# Patient Record
Sex: MALE | Race: WHITE | NOT HISPANIC OR LATINO
[De-identification: names, ages, dates, MRNs, and addresses within clinical notes are randomized per-mention and may not be internally consistent; named-entity substitution may affect disease eponyms.]

---

## 2017-05-18 ENCOUNTER — APPOINTMENT (OUTPATIENT)
Dept: CARDIOLOGY | Facility: CLINIC | Age: 72
End: 2017-05-18

## 2017-08-31 ENCOUNTER — APPOINTMENT (OUTPATIENT)
Dept: CARDIOLOGY | Facility: CLINIC | Age: 72
End: 2017-08-31
Payer: MEDICARE

## 2017-08-31 PROCEDURE — 93000 ELECTROCARDIOGRAM COMPLETE: CPT

## 2017-08-31 PROCEDURE — 99214 OFFICE O/P EST MOD 30 MIN: CPT

## 2018-01-22 ENCOUNTER — RECORD ABSTRACTING (OUTPATIENT)
Age: 73
End: 2018-01-22

## 2018-01-24 ENCOUNTER — RX RENEWAL (OUTPATIENT)
Age: 73
End: 2018-01-24

## 2018-01-25 ENCOUNTER — APPOINTMENT (OUTPATIENT)
Dept: CARDIOLOGY | Facility: CLINIC | Age: 73
End: 2018-01-25

## 2018-02-01 ENCOUNTER — APPOINTMENT (OUTPATIENT)
Dept: CARDIOLOGY | Facility: CLINIC | Age: 73
End: 2018-02-01
Payer: MEDICARE

## 2018-02-01 VITALS
DIASTOLIC BLOOD PRESSURE: 64 MMHG | BODY MASS INDEX: 29.8 KG/M2 | HEIGHT: 72 IN | HEART RATE: 61 BPM | SYSTOLIC BLOOD PRESSURE: 120 MMHG | WEIGHT: 220 LBS

## 2018-02-01 DIAGNOSIS — Z86.19 PERSONAL HISTORY OF OTHER INFECTIOUS AND PARASITIC DISEASES: ICD-10-CM

## 2018-02-01 DIAGNOSIS — Z87.898 PERSONAL HISTORY OF OTHER SPECIFIED CONDITIONS: ICD-10-CM

## 2018-02-01 DIAGNOSIS — Z86.010 PERSONAL HISTORY OF COLONIC POLYPS: ICD-10-CM

## 2018-02-01 PROCEDURE — 99214 OFFICE O/P EST MOD 30 MIN: CPT

## 2018-02-01 RX ORDER — MULTIVITAMIN
TABLET ORAL
Refills: 0 | Status: ACTIVE | COMMUNITY

## 2018-05-29 ENCOUNTER — RX RENEWAL (OUTPATIENT)
Age: 73
End: 2018-05-29

## 2018-06-12 ENCOUNTER — MEDICATION RENEWAL (OUTPATIENT)
Age: 73
End: 2018-06-12

## 2018-08-23 ENCOUNTER — APPOINTMENT (OUTPATIENT)
Dept: CARDIOLOGY | Facility: CLINIC | Age: 73
End: 2018-08-23
Payer: MEDICARE

## 2018-08-23 ENCOUNTER — NON-APPOINTMENT (OUTPATIENT)
Age: 73
End: 2018-08-23

## 2018-08-23 VITALS
SYSTOLIC BLOOD PRESSURE: 110 MMHG | DIASTOLIC BLOOD PRESSURE: 62 MMHG | WEIGHT: 220 LBS | HEIGHT: 72 IN | BODY MASS INDEX: 29.8 KG/M2 | OXYGEN SATURATION: 94 % | HEART RATE: 58 BPM

## 2018-08-23 PROCEDURE — 99214 OFFICE O/P EST MOD 30 MIN: CPT

## 2018-08-23 PROCEDURE — 93000 ELECTROCARDIOGRAM COMPLETE: CPT

## 2018-09-07 ENCOUNTER — CLINICAL ADVICE (OUTPATIENT)
Age: 73
End: 2018-09-07

## 2018-10-22 ENCOUNTER — OUTPATIENT (OUTPATIENT)
Dept: OUTPATIENT SERVICES | Facility: HOSPITAL | Age: 73
LOS: 1 days | End: 2018-10-22

## 2019-01-24 ENCOUNTER — APPOINTMENT (OUTPATIENT)
Dept: CARDIOLOGY | Facility: CLINIC | Age: 74
End: 2019-01-24
Payer: MEDICARE

## 2019-01-24 VITALS
SYSTOLIC BLOOD PRESSURE: 124 MMHG | WEIGHT: 230 LBS | DIASTOLIC BLOOD PRESSURE: 64 MMHG | OXYGEN SATURATION: 97 % | HEART RATE: 61 BPM | BODY MASS INDEX: 31.15 KG/M2 | HEIGHT: 72 IN

## 2019-01-24 PROCEDURE — 99214 OFFICE O/P EST MOD 30 MIN: CPT

## 2019-01-24 NOTE — HISTORY OF PRESENT ILLNESS
[FreeTextEntry1] : Dedrick is a pleasant 73-year-old male With history of overwt., hypertension, dyslipidemia, mild mitral valve prolapse, Prediabetes, EDS \par \par Complaint of pain in the right forearm and elbow. He went to the ER. Ultrasound was negative for DVT. Tendinitis was diagnosed. Motrin was recommended. He is not started this yet. There is no swelling or redness. Pain and tenderness is much improved.\par \par ASx for high T. Sujit on labs, chronic. He has mild Polycythemia with mildly low plt  & T. Sujit is borderline high. He is ASx. He follows Dr. Solorio  \par \par HTN is chronic, controlled with meds and ASx. \par \par He as Mild pre-diabetes. \par \par No history of MI, CHF, CVA, cardiac syncope, angina, chronic renal disease. No shortness of breath\par \par He has high PSA - chronic, seeing Dr. Batista. No Urinary Sx.  \par \par History of cough and sputum production. Seeing pulmonary next week.

## 2019-01-24 NOTE — REVIEW OF SYSTEMS
[Joint Pain] : joint pain [Negative] : Heme/Lymph [Feeling Fatigued] : not feeling fatigued [see HPI] : see HPI [FreeTextEntry1] : Right forearm pain

## 2019-01-24 NOTE — DISCUSSION/SUMMARY
[FreeTextEntry1] : \par - Occasional vertigo secondary to sinus allergies: P.r.n. Antivert and Claritin\par - Solitary varicose vein right forearm; if it increases in size, refer to vascular\par - Hx of Right wrist pain: No swelling or lack of strength. Slight tender on deep palpation. Warm compresses and NSAIDs. Orthopedic consultation. Also suspected right forearm tendinitis. Physical therapy with ultrasound and pain relief. Start Motrin.\par - Overweight: Need Weight loss .. Also reduce high glycemic foods and saturated fats. He has been successful in reducing his weightin the past \par - CT FU with Dr. Navarro and Dr. Batista. \par - Quit Cigar smoking.\par - The patient does have family history of prostate cancer.- His son had it \par \par - Primary care: Reminded regarding eye examination Reminded him regarding routine Prostate examination and follow PSA as well as UA with . \par \par - Had  Follow-up colonoscopy 2018\par - repeat  Routine labs in mid year.\par \par \par Sincerely,\par Frederick Casanova MD, FACC, TIMA\par \par

## 2019-01-24 NOTE — PHYSICAL EXAM
[General Appearance - Well Developed] : well developed [Normal Appearance] : normal appearance [Well Groomed] : well groomed [General Appearance - Well Nourished] : well nourished [No Deformities] : no deformities [General Appearance - In No Acute Distress] : no acute distress [Normal Conjunctiva] : the conjunctiva exhibited no abnormalities [Eyelids - No Xanthelasma] : the eyelids demonstrated no xanthelasmas [Normal Oral Mucosa] : normal oral mucosa [No Oral Pallor] : no oral pallor [No Oral Cyanosis] : no oral cyanosis [Respiration, Rhythm And Depth] : normal respiratory rhythm and effort [Exaggerated Use Of Accessory Muscles For Inspiration] : no accessory muscle use [Auscultation Breath Sounds / Voice Sounds] : lungs were clear to auscultation bilaterally [Heart Rate And Rhythm] : heart rate and rhythm were normal [Heart Sounds] : normal S1 and S2 [Murmurs] : no murmurs present [Abdomen Soft] : soft [Abdomen Tenderness] : non-tender [Abnormal Walk] : normal gait [Gait - Sufficient For Exercise Testing] : the gait was sufficient for exercise testing [Nail Clubbing] : no clubbing of the fingernails [Skin Color & Pigmentation] : normal skin color and pigmentation [Skin Turgor] : normal skin turgor [] : no rash [Oriented To Time, Place, And Person] : oriented to person, place, and time [Affect] : the affect was normal [Mood] : the mood was normal [No Anxiety] : not feeling anxious [FreeTextEntry1] : Solitary dilation of a right forearm vein; nontender; most likely varicose; compressible; nonpulsatile

## 2019-01-24 NOTE — ASSESSMENT
[FreeTextEntry1] : - Ultrasound of the abdomen Aug 2015 showed renal cyst in the lower pole on the right side colon, 7 x 8.4 cm.\par - ETT May 2016: Exercise 7 minutes Augusto protocol.  No ischemia or arrhythmia.\par - EKG 08/31/17: Sinus bradycardia. Right bundle-branch block. No ST change.\par - Labs May 2017\par - Labs January 2018: A1c 5.7, . PSA 4.11.TSH 2.24. Vitamin D pending\par \par

## 2019-02-28 ENCOUNTER — APPOINTMENT (OUTPATIENT)
Dept: CARDIOLOGY | Facility: CLINIC | Age: 74
End: 2019-02-28
Payer: MEDICARE

## 2019-02-28 VITALS
BODY MASS INDEX: 30.88 KG/M2 | SYSTOLIC BLOOD PRESSURE: 110 MMHG | DIASTOLIC BLOOD PRESSURE: 66 MMHG | HEART RATE: 55 BPM | WEIGHT: 228 LBS | OXYGEN SATURATION: 96 % | HEIGHT: 72 IN

## 2019-02-28 PROCEDURE — 99214 OFFICE O/P EST MOD 30 MIN: CPT

## 2019-02-28 NOTE — REVIEW OF SYSTEMS
[Feeling Fatigued] : not feeling fatigued [see HPI] : see HPI [Joint Pain] : joint pain [Negative] : Heme/Lymph [FreeTextEntry1] : Right forearm pain

## 2019-02-28 NOTE — ASSESSMENT
[FreeTextEntry1] : Reviewed previously\par - Ultrasound of the abdomen Aug 2015 showed renal cyst in the lower pole on the right side colon, 7 x 8.4 cm.\par - ETT May 2016: Exercise 7 minutes Augusto protocol.  No ischemia or arrhythmia.\par - EKG 08/31/17: Sinus bradycardia. Right bundle-branch block. No ST change.\par - Labs May 2017\par \par \par Review today:\par - Labs January 2019: Hemoglobin 16.8. Normal white count and platelets 141. . Otherwise normal CMP. Normal CK\par - Labs January 2018: A1c 5.7, . PSA 4.11.TSH 2.24. Vitamin D pending

## 2019-02-28 NOTE — PHYSICAL EXAM
[General Appearance - Well Developed] : well developed [Normal Appearance] : normal appearance [Well Groomed] : well groomed [General Appearance - Well Nourished] : well nourished [No Deformities] : no deformities [General Appearance - In No Acute Distress] : no acute distress [Normal Conjunctiva] : the conjunctiva exhibited no abnormalities [Eyelids - No Xanthelasma] : the eyelids demonstrated no xanthelasmas [Normal Oral Mucosa] : normal oral mucosa [No Oral Pallor] : no oral pallor [No Oral Cyanosis] : no oral cyanosis [Respiration, Rhythm And Depth] : normal respiratory rhythm and effort [Exaggerated Use Of Accessory Muscles For Inspiration] : no accessory muscle use [Auscultation Breath Sounds / Voice Sounds] : lungs were clear to auscultation bilaterally [Heart Rate And Rhythm] : heart rate and rhythm were normal [Heart Sounds] : normal S1 and S2 [Murmurs] : no murmurs present [FreeTextEntry1] : Solitary dilation of a right forearm vein; nontender; most likely varicose; compressible; nonpulsatile [Abdomen Soft] : soft [Abdomen Tenderness] : non-tender [Abnormal Walk] : normal gait [Gait - Sufficient For Exercise Testing] : the gait was sufficient for exercise testing [Nail Clubbing] : no clubbing of the fingernails [Skin Color & Pigmentation] : normal skin color and pigmentation [Skin Turgor] : normal skin turgor [] : no rash [Oriented To Time, Place, And Person] : oriented to person, place, and time [Affect] : the affect was normal [Mood] : the mood was normal [No Anxiety] : not feeling anxious

## 2019-02-28 NOTE — HISTORY OF PRESENT ILLNESS
[FreeTextEntry1] : Dedrick is a pleasant 73-year-old male With history of overwt., hypertension, dyslipidemia, mild mitral valve prolapse, Prediabetes, EDS \par \par Complaint of pain in the right forearm and elbow. He went to the ER. Ultrasound was negative for DVT. Tendinitis was diagnosed. He takes Motrin which helps. He has not gone to physical therapy yet. I have reminded him that it is important.\par \par ASx for high T. Sujit on labs, chronic. He has mild Polycythemia with mildly low plt  & T. Sujit is borderline high. He is ASx. He follows Dr. Solorio  \par \par HTN is chronic, controlled with meds and ASx. \par \par He as Mild pre-diabetes. \par \par No history of MI, CHF, CVA, cardiac syncope, angina, chronic renal disease. No shortness of breath\par \par He has high PSA - chronic, seeing Dr. Batista. No Urinary Sx.  \par \par History of cough and sputum production Which has Improved. He saw pulmonary. CT of the chest was done. Followup CT chest has been scheduled.

## 2019-02-28 NOTE — DISCUSSION/SUMMARY
[FreeTextEntry1] : \par - Occasional vertigo secondary to sinus allergies: P.r.n. Antivert and Claritin\par - Solitary varicose vein right forearm; if it increases in size, refer to vascular\par - Hx of  right forearm tendinitis. I have again reminded Physical therapy with ultrasound and pain relief. on Motrin.\par - Overweight: Need Weight loss .. Also reduce high glycemic foods and saturated fats. He has been successful in reducing his weight in the past \par - CT FU with Dr. Navarro and Dr. Batista. \par - Quit Cigar smoking. He is following pulmonary with CT scans.\par - The patient does have family history of prostate cancer.- His son had it \par \par - Primary care: Reminded regarding eye examination Reminded him regarding routine Prostate examination and follow PSA as well as UA with . \par \par - Had  Follow-up colonoscopy 2018\par - repeat  Routine labs in mid year.\par \par \par Sincerely,\par Frederick Casanova MD, FACC, TIM[facility]\par \par

## 2019-05-28 ENCOUNTER — MEDICATION RENEWAL (OUTPATIENT)
Age: 74
End: 2019-05-28

## 2019-10-31 ENCOUNTER — NON-APPOINTMENT (OUTPATIENT)
Age: 74
End: 2019-10-31

## 2019-10-31 ENCOUNTER — APPOINTMENT (OUTPATIENT)
Dept: CARDIOLOGY | Facility: CLINIC | Age: 74
End: 2019-10-31
Payer: MEDICARE

## 2019-10-31 ENCOUNTER — APPOINTMENT (OUTPATIENT)
Dept: CARDIOLOGY | Facility: CLINIC | Age: 74
End: 2019-10-31

## 2019-10-31 VITALS
OXYGEN SATURATION: 97 % | HEIGHT: 72 IN | WEIGHT: 219 LBS | HEART RATE: 57 BPM | SYSTOLIC BLOOD PRESSURE: 108 MMHG | DIASTOLIC BLOOD PRESSURE: 56 MMHG | BODY MASS INDEX: 29.66 KG/M2
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
## 2019-10-31 PROCEDURE — 90688 IIV4 VACCINE SPLT 0.5 ML IM: CPT

## 2019-10-31 PROCEDURE — 99214 OFFICE O/P EST MOD 30 MIN: CPT

## 2019-10-31 PROCEDURE — 93000 ELECTROCARDIOGRAM COMPLETE: CPT

## 2019-10-31 PROCEDURE — G0008: CPT

## 2019-10-31 RX ORDER — NEBIVOLOL HYDROCHLORIDE 10 MG/1
10 TABLET ORAL
Qty: 90 | Refills: 3 | Status: DISCONTINUED | COMMUNITY
Start: 2018-05-29 | End: 2019-10-31

## 2019-10-31 RX ORDER — DOXYCYCLINE HYCLATE 200 MG/1
200 TABLET, DELAYED RELEASE ORAL DAILY
Qty: 1 | Refills: 0 | Status: DISCONTINUED | COMMUNITY
End: 2019-10-31

## 2019-10-31 NOTE — DISCUSSION/SUMMARY
[FreeTextEntry1] : - Check carotid ultrasound. He will call for the results.\par - Occasional vertigo secondary to sinus allergies: P.r.n. Antivert and Claritin\par - Solitary varicose vein right forearm; if it increases in size, refer to vascular\par - Overweight: Need Weight loss .. Also reduce high glycemic foods and saturated fats. He has been successful in reducing his weight in the past \par - CT FU with Dr. Navarro and Dr. Batista. \par - Quit Cigar smoking. He is following pulmonary.\par - The patient does have family history of prostate cancer.- His son had it . \par \par - Primary care: Reminded regarding eye examination Reminded him regarding routine Prostate examination and follow PSA as well as UA with . \par \par - Had  Follow-up colonoscopy 2018\par - Full shot today\par \par \par Sincerely,\par Frederick Casanova MD, FACC, TIMA\par \par

## 2019-10-31 NOTE — ASSESSMENT
[FreeTextEntry1] : Reviewed previously\par - Ultrasound of the abdomen Aug 2015 showed renal cyst in the lower pole on the right side colon, 7 x 8.4 cm.\par - ETT May 2016: Exercise 7 minutes Augusto protocol.  No ischemia or arrhythmia.\par - EKG 08/31/17: Sinus bradycardia. Right bundle-branch block. No ST change.\par - Labs May 2017\par \par \par Review today:\par - Labs January 2019: Hemoglobin 16.8. Normal white count and platelets 141. . Otherwise normal CMP. Normal CK\par - Labs January 2018: A1c 5.7, . PSA 4.11.TSH 2.24. Vitamin D pending\par - Labs September 2019: LDL 66. Normal A1c, TSH

## 2019-10-31 NOTE — REVIEW OF SYSTEMS
[see HPI] : see HPI [Joint Pain] : joint pain [Negative] : Heme/Lymph [Feeling Fatigued] : not feeling fatigued [FreeTextEntry1] : Right forearm pain

## 2019-10-31 NOTE — HISTORY OF PRESENT ILLNESS
[FreeTextEntry1] : Dedrick is a pleasant 74-year-old male With history of overwt., hypertension, dyslipidemia, mild mitral valve prolapse, Prediabetes, EDS \par \par His elbow tendinitis has resolved. He now has more knee pains\par \par ASx for high T. Sujit on labs In the past. He has mild Polycythemia with mildly low plt  & T. Sujit is borderline high. He is ASx. He follows Dr. Solorio  \par \par HTN is chronic, controlled with meds and ASx. \par \par He as Mild pre-diabetes. \par \par No history of MI, CHF, CVA, cardiac syncope, angina, chronic renal disease. No shortness of breath\par \par He has high PSA - chronic, seeing Dr. Batista. No Urinary Sx.  \par \par No further cough. no chest pain or shortness of breath

## 2019-11-13 ENCOUNTER — APPOINTMENT (OUTPATIENT)
Dept: CARDIOLOGY | Facility: CLINIC | Age: 74
End: 2019-11-13
Payer: MEDICARE

## 2019-11-13 PROCEDURE — 93880 EXTRACRANIAL BILAT STUDY: CPT

## 2019-11-13 PROCEDURE — 93306 TTE W/DOPPLER COMPLETE: CPT

## 2019-12-31 ENCOUNTER — APPOINTMENT (OUTPATIENT)
Dept: CARDIOLOGY | Facility: CLINIC | Age: 74
End: 2019-12-31
Payer: MEDICARE

## 2019-12-31 VITALS
WEIGHT: 218 LBS | HEART RATE: 65 BPM | HEIGHT: 72 IN | DIASTOLIC BLOOD PRESSURE: 70 MMHG | OXYGEN SATURATION: 97 % | SYSTOLIC BLOOD PRESSURE: 140 MMHG | BODY MASS INDEX: 29.53 KG/M2

## 2019-12-31 PROCEDURE — 99214 OFFICE O/P EST MOD 30 MIN: CPT

## 2019-12-31 PROCEDURE — 93000 ELECTROCARDIOGRAM COMPLETE: CPT

## 2020-01-18 NOTE — DISCUSSION/SUMMARY
[FreeTextEntry1] : \par - Occasional vertigo secondary to sinus allergies: P.r.n. Antivert and Claritin\par - Solitary varicose vein right forearm; if it increases in size, refer to vascular\par - Overweight: Need Weight loss .. Also reduce high glycemic foods and saturated fats. \par -Several coronary risk factors; epigastric discomfort -evaluate for CAD noninvasively with nuclear stress testing without beta-blockers.\par - CT FU with Dr. Navarro and Dr. Batista. \par - Quit Cigar smoking. He is following pulmonary.\par - The patient does have family history of prostate cancer.- His son had it . \par \par - Primary care: Reminded regarding outine Prostate examination and follow PSA as well as UA with . \par - Had  Follow-up colonoscopy 2018\par - He had flu shot and pneumonia shot this year.  He had shingles vaccination 4 to 5 years ago.\par \par \par Sincerely,\par Frederick Casanova MD, FACC, TIMA\par \par

## 2020-01-18 NOTE — ASSESSMENT
[FreeTextEntry1] : Reviewed previously\par - Ultrasound of the abdomen Aug 2015 showed renal cyst in the lower pole on the right side colon, 7 x 8.4 cm.\par - ETT May 2016: Exercise 7 minutes Augusto protocol.  No ischemia or arrhythmia.\par - EKG 08/31/17: Sinus bradycardia. Right bundle-branch block. No ST change.\par - Labs May 2017\par \par \par Review today:\par - Labs January 2019: Hemoglobin 16.8. Normal white count and platelets 141. . Otherwise normal CMP. Normal CK\par - Labs January 2018: A1c 5.7, . PSA 4.11.TSH 2.24. Vitamin D pending\par - Labs September 2019: LDL 66. Normal A1c, TSH\par -Carotid ultrasound November 2019: Atherosclerosis without stenosis\par -Echocardiogram November 2019: Normal LV function and wall motion. Normal PASP.  Mild diastolic dysfunction.

## 2020-01-18 NOTE — HISTORY OF PRESENT ILLNESS
[FreeTextEntry1] : Dedrick is a pleasant 74-year-old male With history of overwt., hypertension, dyslipidemia, mild mitral valve prolapse, Prediabetes, EDS \par \par His elbow tendinitis has resolved. He now has more knee pains\par \par ASx for high T. Sujit on labs In the past. He has mild Polycythemia with mildly low plt  & T. Sujit is borderline high. He is ASx. He follows Dr. Solorio  \par \par HTN is chronic, controlled with meds and ASx. \par \par He as Mild pre-diabetes. \par \par No history of MI, CHF, CVA, cardiac syncope, angina, chronic renal disease. No shortness of breath\par \par He has high PSA - chronic, seeing Dr. Batista. No Urinary Sx.  \par \par Denies any palpitations, shortness of breath, PND, orthopnea.  Epigastric chest discomfort is difficult to differentiate from GERD symptoms.

## 2020-01-18 NOTE — ADDENDUM
[FreeTextEntry1] : Pt is scheduled for cataract surgery.\par At present, there are no active cardiac conditions. \par No recent unstable coronary syndromes, decompensated heart failure, severe valvular heart disease or significant dysrhythmias. \par Baseline functional status is acceptable. \par The clinical benefit of the proposed procedure outweighs the associated cardiovascular risk. \par Risk not attenuated with further CV testing. \par Prior testing as outlined above.\par Optimized from a cardiovascular perspective.\par

## 2020-01-18 NOTE — PHYSICAL EXAM
[General Appearance - Well Developed] : well developed [Normal Appearance] : normal appearance [No Deformities] : no deformities [General Appearance - Well Nourished] : well nourished [Well Groomed] : well groomed [Eyelids - No Xanthelasma] : the eyelids demonstrated no xanthelasmas [Normal Conjunctiva] : the conjunctiva exhibited no abnormalities [General Appearance - In No Acute Distress] : no acute distress [Normal Oral Mucosa] : normal oral mucosa [No Oral Pallor] : no oral pallor [No Oral Cyanosis] : no oral cyanosis [Respiration, Rhythm And Depth] : normal respiratory rhythm and effort [Auscultation Breath Sounds / Voice Sounds] : lungs were clear to auscultation bilaterally [Exaggerated Use Of Accessory Muscles For Inspiration] : no accessory muscle use [Heart Sounds] : normal S1 and S2 [Murmurs] : no murmurs present [Heart Rate And Rhythm] : heart rate and rhythm were normal [Abnormal Walk] : normal gait [Abdomen Soft] : soft [Abdomen Tenderness] : non-tender [Gait - Sufficient For Exercise Testing] : the gait was sufficient for exercise testing [Nail Clubbing] : no clubbing of the fingernails [Skin Color & Pigmentation] : normal skin color and pigmentation [] : no rash [Skin Turgor] : normal skin turgor [Oriented To Time, Place, And Person] : oriented to person, place, and time [Affect] : the affect was normal [No Anxiety] : not feeling anxious [Mood] : the mood was normal [FreeTextEntry1] : Solitary dilation of a right forearm vein; nontender; most likely varicose; compressible; nonpulsatile

## 2020-06-08 ENCOUNTER — APPOINTMENT (OUTPATIENT)
Dept: CARDIOLOGY | Facility: CLINIC | Age: 75
End: 2020-06-08
Payer: MEDICARE

## 2020-06-08 ENCOUNTER — APPOINTMENT (OUTPATIENT)
Dept: CARDIOLOGY | Facility: CLINIC | Age: 75
End: 2020-06-08

## 2020-06-08 PROCEDURE — 93015 CV STRESS TEST SUPVJ I&R: CPT

## 2020-06-12 ENCOUNTER — APPOINTMENT (OUTPATIENT)
Dept: CARDIOLOGY | Facility: CLINIC | Age: 75
End: 2020-06-12
Payer: MEDICARE

## 2020-06-12 PROCEDURE — 78452 HT MUSCLE IMAGE SPECT MULT: CPT

## 2020-06-12 PROCEDURE — A9502: CPT

## 2020-06-12 PROCEDURE — 93015 CV STRESS TEST SUPVJ I&R: CPT

## 2020-06-15 ENCOUNTER — APPOINTMENT (OUTPATIENT)
Dept: CARDIOLOGY | Facility: CLINIC | Age: 75
End: 2020-06-15
Payer: MEDICARE

## 2020-06-15 VITALS
HEIGHT: 72 IN | BODY MASS INDEX: 28.99 KG/M2 | HEART RATE: 60 BPM | OXYGEN SATURATION: 98 % | SYSTOLIC BLOOD PRESSURE: 112 MMHG | WEIGHT: 214 LBS | TEMPERATURE: 97.8 F | DIASTOLIC BLOOD PRESSURE: 66 MMHG

## 2020-06-15 PROCEDURE — 99214 OFFICE O/P EST MOD 30 MIN: CPT

## 2020-06-15 RX ORDER — ASPIRIN 325 MG/1
325 TABLET, FILM COATED ORAL
Refills: 0 | Status: DISCONTINUED | COMMUNITY
End: 2020-06-15

## 2020-06-15 RX ORDER — UBIDECARENONE/VIT E ACET 100MG-5
100 CAPSULE ORAL
Refills: 0 | Status: DISCONTINUED | COMMUNITY
End: 2020-06-15

## 2020-06-15 NOTE — PHYSICAL EXAM
[Normal Appearance] : normal appearance [General Appearance - Well Developed] : well developed [General Appearance - Well Nourished] : well nourished [Well Groomed] : well groomed [No Deformities] : no deformities [Normal Conjunctiva] : the conjunctiva exhibited no abnormalities [General Appearance - In No Acute Distress] : no acute distress [Eyelids - No Xanthelasma] : the eyelids demonstrated no xanthelasmas [No Oral Pallor] : no oral pallor [Normal Oral Mucosa] : normal oral mucosa [No Oral Cyanosis] : no oral cyanosis [Exaggerated Use Of Accessory Muscles For Inspiration] : no accessory muscle use [Respiration, Rhythm And Depth] : normal respiratory rhythm and effort [Auscultation Breath Sounds / Voice Sounds] : lungs were clear to auscultation bilaterally [Heart Rate And Rhythm] : heart rate and rhythm were normal [Murmurs] : no murmurs present [Heart Sounds] : normal S1 and S2 [FreeTextEntry1] : Solitary dilation of a right forearm vein; nontender; most likely varicose; compressible; nonpulsatile [Abdomen Soft] : soft [Abnormal Walk] : normal gait [Abdomen Tenderness] : non-tender [Nail Clubbing] : no clubbing of the fingernails [Gait - Sufficient For Exercise Testing] : the gait was sufficient for exercise testing [Skin Color & Pigmentation] : normal skin color and pigmentation [] : no rash [Skin Turgor] : normal skin turgor [Affect] : the affect was normal [Oriented To Time, Place, And Person] : oriented to person, place, and time [Mood] : the mood was normal [No Anxiety] : not feeling anxious

## 2020-06-15 NOTE — ASSESSMENT
[FreeTextEntry1] : Reviewed previously\par - Ultrasound of the abdomen Aug 2015 showed renal cyst in the lower pole on the right side colon, 7 x 8.4 cm.\par - ETT May 2016: Exercise 7 minutes Augusto protocol.  No ischemia or arrhythmia.\par - EKG 08/31/17: Sinus bradycardia. Right bundle-branch block. No ST change.\par - Labs May 2017\par \par \par Review today:\par - Labs January 2019: Hemoglobin 16.8. Normal white count and platelets 141. . Otherwise normal CMP. Normal CK\par - Labs January 2018: A1c 5.7, . PSA 4.11.TSH 2.24. Vitamin D pending\par - Labs September 2019: LDL 66. Normal A1c, TSH\par -Carotid ultrasound November 2019: Atherosclerosis without stenosis\par -Echocardiogram November 2019: Normal LV function and wall motion. Normal PASP.  Mild diastolic dysfunction.\par -ETT June 2020: Exercise 6 minutes on Augusto protocol.  He had atypical mild chest pain during exercise ischemic EKG changes\par -Nuclear stress test June 2020: Diaphragmatic attenuation.  No ischemia.

## 2020-06-15 NOTE — DISCUSSION/SUMMARY
[FreeTextEntry1] : \par - Occasional vertigo secondary to sinus allergies: P.r.n. Antivert and Claritin\par - Solitary varicose vein right forearm; if it increases in size, refer to vascular\par - Overweight: Patient exercising and reducing weight.  He has lost 16 pounds in the past year.  Also reduce high glycemic foods and saturated fats. \par - CT FU with Dr. Navarro and Dr. Batista. \par - Quit Cigar smoking. He is following pulmonary.\par - The patient does have family history of prostate cancer.- His son had it . \par \par - Primary care: Reminded regarding routine Prostate examination and follow PSA as well as UA with . \par - Had  Follow-up colonoscopy 2018\par - He had flu shot and pneumonia shot this year.  He had shingles vaccination 4 to 5 years ago.\par \par \par Sincerely,\par Frederick Casanova MD, FACC, TIMA\par \par

## 2020-06-15 NOTE — HISTORY OF PRESENT ILLNESS
[FreeTextEntry1] : Dedrick is a pleasant 74-year-old male With history of overwt., hypertension, dyslipidemia, mild mitral valve prolapse, Prediabetes, EDS \par \par His elbow tendinitis has resolved. He now has more knee pains.  Exercises vigorously from half an hour to 1 hour and does not have any chest pain or shortness of breath.\par \par ASx for high T. Sujit on labs In the past. He has mild Polycythemia with mildly low plt  & T. Sujit is borderline high. He is ASx. He follows Dr. Solorio  \par \par HTN is chronic, controlled with meds and ASx. \par \par He as Mild pre-diabetes. \par \par No history of MI, CHF, CVA, cardiac syncope, angina, chronic renal disease. No shortness of breath\par \par He has high PSA - chronic, seeing Dr. Batista. No Urinary Sx.  \par \par Denies any palpitations, shortness of breath, PND, orthopnea.  Epigastric chest discomfort is difficult to differentiate from GERD symptoms.

## 2020-12-07 ENCOUNTER — APPOINTMENT (OUTPATIENT)
Dept: CARDIOLOGY | Facility: CLINIC | Age: 75
End: 2020-12-07
Payer: MEDICARE

## 2020-12-07 VITALS
WEIGHT: 215 LBS | SYSTOLIC BLOOD PRESSURE: 110 MMHG | OXYGEN SATURATION: 98 % | TEMPERATURE: 97.7 F | HEART RATE: 56 BPM | HEIGHT: 72 IN | DIASTOLIC BLOOD PRESSURE: 60 MMHG | BODY MASS INDEX: 29.12 KG/M2

## 2020-12-07 DIAGNOSIS — Z87.891 PERSONAL HISTORY OF NICOTINE DEPENDENCE: ICD-10-CM

## 2020-12-07 DIAGNOSIS — F17.200 NICOTINE DEPENDENCE, UNSPECIFIED, UNCOMPLICATED: ICD-10-CM

## 2020-12-07 PROCEDURE — 99214 OFFICE O/P EST MOD 30 MIN: CPT

## 2020-12-07 RX ORDER — HYDROCORTISONE ACETATE 0.5 %
CREAM (GRAM) TOPICAL
Refills: 0 | Status: DISCONTINUED | COMMUNITY
End: 2020-12-07

## 2020-12-07 RX ORDER — DOXYCYCLINE HYCLATE 200 MG/1
200 TABLET, DELAYED RELEASE ORAL
Qty: 1 | Refills: 0 | Status: DISCONTINUED | COMMUNITY
End: 2020-12-07

## 2020-12-07 RX ORDER — IBUPROFEN 200 MG/1
TABLET, FILM COATED ORAL
Refills: 0 | Status: DISCONTINUED | COMMUNITY
End: 2020-12-07

## 2020-12-07 RX ORDER — INFLUENZA A VIRUS A/CALIFORNIA/7/2009 X-181 (H1N1) ANTIGEN (PROPIOLACTONE INACTIVATED), INFLUENZA A VIRUS A/TEXAS/50/2012 X-223 (H3N2) ANTIGEN (PROPIOLACTONE INACTIVATED), AND INFLUENZA B VIRUS B/MASSACHUSETTS/2/2012 BX-51B ANTIGEN (PROPIOLACTONE INACTIVATED) 15; 15; 15 UG/.5ML; UG/.5ML; UG/.5ML
INJECTION, SUSPENSION INTRAMUSCULAR AS DIRECTED
Qty: 1 | Refills: 0 | Status: COMPLETED | COMMUNITY
Start: 2019-10-31 | End: 2020-12-07

## 2020-12-07 NOTE — HISTORY OF PRESENT ILLNESS
[FreeTextEntry1] : Dedrick is a pleasant 74-year-old male With history of overwt., hypertension, dyslipidemia, mild mitral valve prolapse, Prediabetes, EDS \par \par His elbow tendinitis has resolved. He now has more knee pains.  Exercises vigorously from half an hour to 1 hour and does not have any chest pain or shortness of breath.\par \par ASx for high T. Suijt on labs In the past. He has mild Polycythemia with mildly low plt  & T. Sujit is borderline high. He is ASx. He follows Dr. Solorio  \par \par HTN is chronic, controlled with meds and ASx. \par \par He as Mild pre-diabetes. \par \par No history of MI, CHF, CVA, cardiac syncope, angina, chronic renal disease. No shortness of breath\par \par He has high PSA - chronic, seeing Dr. Batista. No Urinary Sx.  \par \par Denies any palpitations, shortness of breath, PND, orthopnea.  Epigastric chest discomfort is difficult to differentiate from GERD symptoms.

## 2020-12-07 NOTE — DISCUSSION/SUMMARY
[FreeTextEntry1] : \par - Solitary varicose vein right forearm; if it increases in size, refer to vascular\par - Overweight: Patient exercising and reducing weight.  He has lost 16 pounds in the past year.  In the past 2 months it has increased since he quit smoking ; also reduce high glycemic foods and saturated fats. \par - CT FU with Dr. Navarro and Dr. Batista. \par -Follow-up with GI regarding possible future colonoscopy?\par - Quit Cigar smoking. He is following pulmonary.\par - The patient does have family history of prostate cancer.- His son had it . \par \par - Primary care: Reminded regarding routine Prostate examination and follow PSA as well as UA with . \par - Had  Follow-up colonoscopy 2018\par -I did have him regarding  flu shot this year.  He had nd pneumonia last year.  He had shingles vaccination 4 to 5 years ago.\par \par \par Sincerely,\par Frederick Casanova MD, FACC, TIMA\par \par

## 2021-03-19 ENCOUNTER — APPOINTMENT (OUTPATIENT)
Dept: CARDIOLOGY | Facility: CLINIC | Age: 76
End: 2021-03-19
Payer: MEDICARE

## 2021-03-19 PROCEDURE — 93306 TTE W/DOPPLER COMPLETE: CPT

## 2021-05-17 ENCOUNTER — RX RENEWAL (OUTPATIENT)
Age: 76
End: 2021-05-17

## 2021-06-24 ENCOUNTER — APPOINTMENT (OUTPATIENT)
Dept: CARDIOLOGY | Facility: CLINIC | Age: 76
End: 2021-06-24
Payer: MEDICARE

## 2021-06-24 VITALS
DIASTOLIC BLOOD PRESSURE: 68 MMHG | BODY MASS INDEX: 28.44 KG/M2 | HEART RATE: 59 BPM | WEIGHT: 210 LBS | OXYGEN SATURATION: 94 % | HEIGHT: 72 IN | SYSTOLIC BLOOD PRESSURE: 108 MMHG | TEMPERATURE: 97.3 F

## 2021-06-24 PROCEDURE — 99214 OFFICE O/P EST MOD 30 MIN: CPT

## 2021-06-24 RX ORDER — ASPIRIN 81 MG
81 TABLET, DELAYED RELEASE (ENTERIC COATED) ORAL DAILY
Refills: 0 | Status: DISCONTINUED | COMMUNITY
End: 2021-06-24

## 2021-06-24 NOTE — HISTORY OF PRESENT ILLNESS
[FreeTextEntry1] : Dedrick is a pleasant 75-year-old male With history of overwt., hypertension, dyslipidemia, mild mitral valve prolapse, Prediabetes, EDS \par \par His elbow tendinitis has resolved. He now has more knee pains.  Exercises vigorously from half an hour to 1 hour and does not have any chest pain or shortness of breath.\par \par ASx for high T. Sujit on labs In the past. He has mild Polycythemia with mildly low plt  & T. Sujit is borderline high. He is ASx. He follows Dr. Solorio  \par \par HTN is chronic, controlled with meds and ASx. \par \par He as Mild pre-diabetes. \par \par No history of MI, CHF, CVA, cardiac syncope, angina, chronic renal disease. No shortness of breath\par \par He has high PSA - chronic, seeing specialist at [facility] -on radiation treatment and hormone therapy.  No Urinary Sx.  \par \par Denies any palpitations, shortness of breath, PND, orthopnea.

## 2021-06-24 NOTE — PHYSICAL EXAM
[General Appearance - Well Developed] : well developed [Normal Appearance] : normal appearance [Well Groomed] : well groomed [General Appearance - Well Nourished] : well nourished [No Deformities] : no deformities [General Appearance - In No Acute Distress] : no acute distress [Normal Conjunctiva] : the conjunctiva exhibited no abnormalities [Eyelids - No Xanthelasma] : the eyelids demonstrated no xanthelasmas [Normal Oral Mucosa] : normal oral mucosa [No Oral Pallor] : no oral pallor [No Oral Cyanosis] : no oral cyanosis [Respiration, Rhythm And Depth] : normal respiratory rhythm and effort [Exaggerated Use Of Accessory Muscles For Inspiration] : no accessory muscle use [Auscultation Breath Sounds / Voice Sounds] : lungs were clear to auscultation bilaterally [Heart Rate And Rhythm] : heart rate and rhythm were normal [Heart Sounds] : normal S1 and S2 [Murmurs] : no murmurs present [FreeTextEntry1] : Solitary dilation of a right forearm vein; nontender; most likely varicose; compressible; nonpulsatile [Abdomen Soft] : soft [Abnormal Walk] : normal gait [Abdomen Tenderness] : non-tender [Gait - Sufficient For Exercise Testing] : the gait was sufficient for exercise testing [Nail Clubbing] : no clubbing of the fingernails [Skin Color & Pigmentation] : normal skin color and pigmentation [Skin Turgor] : normal skin turgor [] : no rash [Oriented To Time, Place, And Person] : oriented to person, place, and time [Affect] : the affect was normal [Mood] : the mood was normal [No Anxiety] : not feeling anxious

## 2021-06-24 NOTE — ASSESSMENT
[FreeTextEntry1] : Reviewed previously\par - Ultrasound of the abdomen Aug 2015 showed renal cyst in the lower pole on the right side colon, 7 x 8.4 cm.\par - ETT May 2016: Exercise 7 minutes Augusto protocol.  No ischemia or arrhythmia.\par - EKG 08/31/17: Sinus bradycardia. Right bundle-branch block. No ST change.\par - Labs January 2019: Hemoglobin 16.8. Normal white count and platelets 141. . Otherwise normal CMP. Normal CK\par - Labs January 2018: A1c 5.7, . PSA 4.11.TSH 2.24. Vitamin D pending\par - Labs September 2019: LDL 66. Normal A1c, TSH\par \par \par Review today:\par \par -Carotid ultrasound November 2019: Atherosclerosis without stenosis\par -Echocardiogram November 2019: Normal LV function and wall motion. Normal PASP.  Mild diastolic dysfunction.\par -ETT June 2020: Exercise 6 minutes on Augusto protocol.  He had atypical mild chest pain during exercise ischemic EKG changes\par -Nuclear stress test June 2020: Diaphragmatic attenuation.  No ischemia.\par -Labs June 2021: .  Normal LFT.  LDL 76

## 2021-11-29 ENCOUNTER — APPOINTMENT (OUTPATIENT)
Dept: CARDIOLOGY | Facility: CLINIC | Age: 76
End: 2021-11-29
Payer: MEDICARE

## 2021-11-29 PROCEDURE — 99214 OFFICE O/P EST MOD 30 MIN: CPT

## 2021-11-29 RX ORDER — TAMSULOSIN HYDROCHLORIDE 0.4 MG/1
0.4 CAPSULE ORAL
Qty: 30 | Refills: 0 | Status: ACTIVE | COMMUNITY
Start: 2021-09-17

## 2021-11-29 RX ORDER — NEBIVOLOL HYDROCHLORIDE 10 MG/1
10 TABLET ORAL DAILY
Qty: 90 | Refills: 3 | Status: DISCONTINUED | COMMUNITY
End: 2021-11-29

## 2021-11-29 NOTE — DISCUSSION/SUMMARY
[FreeTextEntry1] : \par - Solitary varicose vein right forearm; if it increases in size, refer to vascular; so far it has been asymptomatic and stable\par - Overweight: Patient trying to reduce weight.  He had lost 16 pounds in the past year.  In the past 8 months it has increased since he quit smoking ; also reduce high glycemic foods and saturated fats. \par - CT FU with Dr. Navarro and [facility]. \par -Follow-up with GI -he is seeing Dr. Crawford tomorrow.  He does have some reflux symptoms after taking Motrin.\par -Patient has quit Cigar smoking. He is following pulmonary -Dr. Mills\par - The patient does have family history of prostate cancer.- His son had it \par -Slight pedal edema.  Probably secondary to weight gain and salt intake\par \par - Primary care: -Reminded him of eye exam this year.  Seeing GI tomorrow.\par - He had 2nd pneumonia 2019.  He had shingles vaccination 4 to 5 years ago.  Reminded him regarding flu vaccination.  He has Covid vaccination.\par \par **Preop: \par Patient may be going for colonoscopy.  He is low risk for colonoscopy.\par \par \par Sincerely,\par Frederick Casanova MD, FACC, TIMA\par \par

## 2021-11-29 NOTE — HISTORY OF PRESENT ILLNESS
[FreeTextEntry1] : Dedrick is a pleasant 76-year-old male With history of overwt., hypertension, dyslipidemia, mild mitral valve prolapse, Prediabetes, EDS \par \par Is fairly active without any cardiac symptoms.\par \par ASx for high T. Sujit on labs In the past. He has mild Polycythemia with mildly low plt  & T. Sujit is borderline high. He is ASx. He follows Dr. Solorio  \par \par HTN is chronic, controlled with meds and ASx. \par \par He as Mild pre-diabetes.  He has gained some weight since the last office visit.\par \par No history of MI, CHF, CVA, cardiac syncope, angina, chronic renal disease. No shortness of breath\par \par He has high PSA - chronic, seeing specialist at [facility] -on radiation treatment and hormone therapy.  No Urinary Sx.  \par \par Denies any palpitations, shortness of breath, PND, orthopnea.

## 2021-11-29 NOTE — PHYSICAL EXAM
[General Appearance - Well Developed] : well developed [Normal Appearance] : normal appearance [Well Groomed] : well groomed [General Appearance - Well Nourished] : well nourished [No Deformities] : no deformities [General Appearance - In No Acute Distress] : no acute distress [Normal Conjunctiva] : the conjunctiva exhibited no abnormalities [Eyelids - No Xanthelasma] : the eyelids demonstrated no xanthelasmas [Normal Oral Mucosa] : normal oral mucosa [No Oral Pallor] : no oral pallor [No Oral Cyanosis] : no oral cyanosis [Respiration, Rhythm And Depth] : normal respiratory rhythm and effort [Exaggerated Use Of Accessory Muscles For Inspiration] : no accessory muscle use [Auscultation Breath Sounds / Voice Sounds] : lungs were clear to auscultation bilaterally [Heart Rate And Rhythm] : heart rate and rhythm were normal [Heart Sounds] : normal S1 and S2 [Murmurs] : no murmurs present [Abdomen Soft] : soft [Abdomen Tenderness] : non-tender [Abnormal Walk] : normal gait [Gait - Sufficient For Exercise Testing] : the gait was sufficient for exercise testing [Nail Clubbing] : no clubbing of the fingernails [Skin Color & Pigmentation] : normal skin color and pigmentation [Skin Turgor] : normal skin turgor [] : no rash [Oriented To Time, Place, And Person] : oriented to person, place, and time [Affect] : the affect was normal [Mood] : the mood was normal [No Anxiety] : not feeling anxious [FreeTextEntry1] : Obese

## 2021-11-29 NOTE — ASSESSMENT
[FreeTextEntry1] : Reviewed previously\par - Ultrasound of the abdomen Aug 2015 showed renal cyst in the lower pole on the right side colon, 7 x 8.4 cm.\par - ETT May 2016: Exercise 7 minutes Augusto protocol.  No ischemia or arrhythmia.\par - EKG 08/31/17: Sinus bradycardia. Right bundle-branch block. No ST change.\par - Labs January 2019: Hemoglobin 16.8. Normal white count and platelets 141. . Otherwise normal CMP. Normal CK\par - Labs January 2018: A1c 5.7, . PSA 4.11.TSH 2.24. Vitamin D pending\par - Labs September 2019: LDL 66. Normal A1c, TSH\par \par \par Review today:\par \par -Carotid ultrasound November 2019: Atherosclerosis without stenosis\par -Echocardiogram November 2019: Normal LV function and wall motion. Normal PASP.  Mild diastolic dysfunction.\par -ETT June 2020: Exercise 6 minutes on Augusto protocol.  He had atypical mild chest pain during exercise ischemic EKG changes\par -Nuclear stress test June 2020: Diaphragmatic attenuation.  No ischemia.\par -Labs June 2021: .  Normal LFT.  LDL 76\par -Echo March 2021

## 2022-02-14 ENCOUNTER — EMERGENCY (EMERGENCY)
Facility: HOSPITAL | Age: 77
LOS: 1 days | Discharge: HOME | End: 2022-02-14
Admitting: EMERGENCY MEDICINE
Payer: MEDICARE
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
## 2022-02-14 DIAGNOSIS — E78.5 HYPERLIPIDEMIA, UNSPECIFIED: ICD-10-CM

## 2022-02-14 DIAGNOSIS — S09.90XA UNSPECIFIED INJURY OF HEAD, INITIAL ENCOUNTER: ICD-10-CM

## 2022-02-14 DIAGNOSIS — S01.81XA LACERATION WITHOUT FOREIGN BODY OF OTHER PART OF HEAD, INITIAL ENCOUNTER: ICD-10-CM
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
## 2022-02-14 DIAGNOSIS — I10 ESSENTIAL (PRIMARY) HYPERTENSION: ICD-10-CM
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
## 2022-02-14 PROCEDURE — 99284 EMERGENCY DEPT VISIT MOD MDM: CPT | Mod: 25

## 2022-02-14 PROCEDURE — 70450 CT HEAD/BRAIN W/O DYE: CPT | Mod: 26

## 2022-02-14 PROCEDURE — G0168: CPT

## 2022-02-22 ENCOUNTER — APPOINTMENT (OUTPATIENT)
Dept: CARDIOLOGY | Facility: CLINIC | Age: 77
End: 2022-02-22
Payer: MEDICARE

## 2022-02-22 VITALS
BODY MASS INDEX: 29.39 KG/M2 | WEIGHT: 217 LBS | OXYGEN SATURATION: 98 % | TEMPERATURE: 97.3 F | HEART RATE: 52 BPM | DIASTOLIC BLOOD PRESSURE: 62 MMHG | SYSTOLIC BLOOD PRESSURE: 120 MMHG | HEIGHT: 72 IN

## 2022-02-22 PROCEDURE — 99214 OFFICE O/P EST MOD 30 MIN: CPT

## 2022-02-22 RX ORDER — IBUPROFEN 200 MG/1
200 TABLET, COATED ORAL 3 TIMES DAILY
Refills: 0 | Status: DISCONTINUED | COMMUNITY
Start: 2021-06-24 | End: 2022-02-22

## 2022-02-22 NOTE — PHYSICAL EXAM
[General Appearance - Well Developed] : well developed [Normal Appearance] : normal appearance [Well Groomed] : well groomed [General Appearance - Well Nourished] : well nourished [No Deformities] : no deformities [General Appearance - In No Acute Distress] : no acute distress [Normal Conjunctiva] : the conjunctiva exhibited no abnormalities [Eyelids - No Xanthelasma] : the eyelids demonstrated no xanthelasmas [Respiration, Rhythm And Depth] : normal respiratory rhythm and effort [Exaggerated Use Of Accessory Muscles For Inspiration] : no accessory muscle use [Auscultation Breath Sounds / Voice Sounds] : lungs were clear to auscultation bilaterally [Heart Rate And Rhythm] : heart rate and rhythm were normal [Heart Sounds] : normal S1 and S2 [Murmurs] : no murmurs present [Abdomen Soft] : soft [Abdomen Tenderness] : non-tender [Abnormal Walk] : normal gait [Gait - Sufficient For Exercise Testing] : the gait was sufficient for exercise testing [Skin Color & Pigmentation] : normal skin color and pigmentation [Skin Turgor] : normal skin turgor [] : no rash [Oriented To Time, Place, And Person] : oriented to person, place, and time [Affect] : the affect was normal [Mood] : the mood was normal [No Anxiety] : not feeling anxious [FreeTextEntry1] : Volar displacement of Lt 5th finger DIP

## 2022-02-22 NOTE — HISTORY OF PRESENT ILLNESS
[FreeTextEntry1] : Dedrick is a pleasant 76-year-old male With history of overweight., hypertension, dyslipidemia, mild mitral valve prolapse, Prediabetes, EDS \par \par Is fairly active without any cardiac symptoms.\par \par Presents today for hospital follow-up.  Suffered mechanical fall with head laceration.  Tripped over a post while pumping gas and fell onto his car.  There was no LOC.  Evaluated in the ED.  CT of the head did not reveal any acute intracranial hemorrhage or fracture.  Dermabond was utilized to close the wound.  Patient has not had any neurological events since the fall.\par Of note, he shows me his left fifth digit.  There is a volar displacement of the DIP.  States he did not feel the injury as his hands were cold at the time of his fall.  It was not x-rayed.\par \par Prior HX: \par ASx for high T. Sujit on labs In the past. He has mild Polycythemia with mildly low plt  & T. Sujit is borderline high. He is ASx. He follows Dr. Solorio  \par \par HTN is chronic, controlled with meds and ASx. \par \par He as Mild pre-diabetes.  He has gained some weight since the last office visit.\par \par No history of MI, CHF, CVA, cardiac syncope, angina, chronic renal disease. No shortness of breath\par \par He has high PSA - chronic, seeing specialist at [facility] -on radiation treatment and hormone therapy.  No Urinary Sx.  \par \par Denies any palpitations, shortness of breath, PND, orthopnea.  \par \par

## 2022-02-22 NOTE — ASSESSMENT
[FreeTextEntry1] : Reviewed previously\par - Ultrasound of the abdomen Aug 2015 showed renal cyst in the lower pole on the right side colon, 7 x 8.4 cm.\par - ETT May 2016: Exercise 7 minutes Augusto protocol.  No ischemia or arrhythmia.\par - EKG 08/31/17: Sinus bradycardia. Right bundle-branch block. No ST change.\par - Labs January 2019: Hemoglobin 16.8. Normal white count and platelets 141. . Otherwise normal CMP. Normal CK\par - Labs January 2018: A1c 5.7, . PSA 4.11.TSH 2.24. Vitamin D pending\par - Labs September 2019: LDL 66. Normal A1c, TSH\par \par -Carotid ultrasound November 2019: Atherosclerosis without stenosis\par -Echocardiogram November 2019: Normal LV function and wall motion. Normal PASP.  Mild diastolic dysfunction.\par -ETT June 2020: Exercise 6 minutes on Augusto protocol.  He had atypical mild chest pain during exercise ischemic EKG changes\par -Nuclear stress test June 2020: Diaphragmatic attenuation.  No ischemia.\par -Labs June 2021: .  Normal LFT.  LDL 76\par -Echo March 2021

## 2022-02-22 NOTE — DISCUSSION/SUMMARY
[FreeTextEntry1] : -Healing laceration on left forehead.  Closed utilizing Dermabond.  No sutures.  Keep wound clean and dry.  May shower.  Advised not to clean vigorously.\par -Most likely DIP fracture resulting in mallet finger.  Called orthopedist, Dr. Prasad.  Made appointment for patient first available.  This will be next week.  In the meantime, advised patient to purchase splint and splint DIP.  Advised patient to return the office to apply splint.  States he will do it on his own.  Instructed patient in the proper position of the finger and to keep it in this position until he follows with Dr. Prasad next week.\par - Solitary varicose vein right forearm; if it increases in size, refer to vascular; so far it has been asymptomatic and stable\par - Overweight: Patient trying to reduce weight.  He had lost 16 pounds in the past year.  In the past 8 months it has increased since he quit smoking ; also reduce high glycemic foods and saturated fats. \par - CT FU with Dr. Navarro and [facility]. \par -Follow-up with GI -he is seeing Dr. Crawford tomorrow.  He does have some reflux symptoms after taking Motrin.\par -Patient has quit Cigar smoking. He is following pulmonary -Dr. Mills\par - The patient does have family history of prostate cancer.- His son had it \par -Slight pedal edema.  Probably secondary to weight gain and salt intake\par \par - Primary care: -Reminded him of eye exam this year.  Seeing GI tomorrow.\par - He had 2nd pneumonia 2019.  He had shingles vaccination 4 to 5 years ago.  Reminded him regarding flu vaccination.  He has Covid vaccination.

## 2022-02-28 ENCOUNTER — APPOINTMENT (OUTPATIENT)
Dept: ORTHOPEDIC SURGERY | Facility: CLINIC | Age: 77
End: 2022-02-28
Payer: MEDICARE

## 2022-02-28 VITALS — BODY MASS INDEX: 29.8 KG/M2 | HEIGHT: 72 IN | TEMPERATURE: 97.1 F | WEIGHT: 220 LBS

## 2022-02-28 PROCEDURE — 73140 X-RAY EXAM OF FINGER(S): CPT | Mod: F4

## 2022-02-28 PROCEDURE — 29130 APPL FINGER SPLINT STATIC: CPT | Mod: F4

## 2022-02-28 PROCEDURE — 99203 OFFICE O/P NEW LOW 30 MIN: CPT | Mod: 25

## 2022-02-28 NOTE — END OF VISIT
[FreeTextEntry3] : This note was written by Jorge Chaudhry on 02/28/2022 acting solely as a scribe for Dr. Attila Prasad.\par  \par All medical record entries made by the Scribe were at my, Dr. Attila Prasda, direction and personally dictated by me on 02/28/2022. I have personally reviewed the chart and agree that the record accurately reflects my personal performance of the history, physical exam.

## 2022-02-28 NOTE — CONSULT LETTER
[Dear  ___] : Dear  [unfilled], [Consult Letter:] : I had the pleasure of evaluating your patient, [unfilled]. [Please see my note below.] : Please see my note below. [Consult Closing:] : Thank you very much for allowing me to participate in the care of this patient.  If you have any questions, please do not hesitate to contact me. [Sincerely,] : Sincerely, [FreeTextEntry3] : Attila Prasad M.D.\par Surgery of the Hand & Upper Extremity\par Orthopaedic Surgery\par Chief, Hand Service, [facility]\par Director, Hand Service, [facility]\par  of Orthopedic Surgery, [facility] [employer] [facility] at [facility] \par [facility] HospitalEmail: [email or web address]\par Office Phone: [phone]\par

## 2022-02-28 NOTE — PHYSICAL EXAM
[de-identified] : - Constitutional: This is a male in no obvious distress.  \par - Psych: Patient is alert and oriented to person, place and time.  Patient has a normal mood and affect.\par - Cardiovascular: Normal pulses throughout the upper extremities.  No significant varicosities are noted in the upper extremities. \par - Neuro: Strength and sensation are intact throughout the upper extremities.  Patient has normal coordination.\par - Respiratory:  Patient exhibits no evidence of shortness of breath or difficulty breathing.\par - Skin: No rashes, lesions, or other abnormalities are noted in the upper extremities.\par \par ---\par \par Examination of his left little finger after the splint was removed demonstrates swelling along the dorsal aspect of the DIP joint.  There is tenderness in this region.  He has loss of the terminal 30 degrees of DIP extension.  He is neurovascularly intact distally. [de-identified] : AP, lateral, and oblique radiographs of his left little finger demonstrate a distal phalanx mallet avulsion fracture with some displacement.  The overall alignment is acceptable and there is no obvious subluxation of the DIP joint.  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

## 2022-02-28 NOTE — HISTORY OF PRESENT ILLNESS
[Right] : right hand dominant [FreeTextEntry1] : He comes in today for evaluation of a left little finger injury, which occurred 2 weeks ago. He states that he was walking and fell. He states that he hit his head on the car. He was seen in the ER for his head and did not know his left little finger is injured. He later was seen by his PCP and was place in a splint for 1 week. He denies having x-rays done for his fingers or evaluation done for his finger at that time. He takes Tylenol for the pain. He rates his pain a 6 out of 10 at this time. \par \par He was referred by Dr. Casanova.

## 2022-02-28 NOTE — ADDENDUM
[FreeTextEntry1] : I, Jorge Chaudhry, acted solely as a scribe for Dr. Prasad on this date on 02/28/2022.

## 2022-02-28 NOTE — DISCUSSION/SUMMARY
[FreeTextEntry1] : He has findings consistent with a left little finger mallet avulsion fracture with some displacement per there is no obvious subluxation the DIP joint.\par \par I had a discussion with the patient regarding today's visit, the prognosis of this diagnosis, and treatment recommendations and options. At this time, I placed him in a splint. I recommended that her gets a splint molded for his left little finger. He was given the appropriate referral.  He was instructed on full-time splinting.  He understands that even with full-time splinting, the finger may not regain full extension.  However, unless the fracture further displaces and/or there is subluxation of the joint, I have recommended nonoperative management.  I discussed the nature of mallet injuries with him.\par \par The patient has agreed to the above plan of management and has expressed full understanding.  All questions were fully answered to the patient's satisfaction. \par \par My cumulative time spent on this visit included: Preparation for the visit, review of the medical records, review of pertinent diagnostic studies, examination and counseling of the patient on the above diagnosis, treatment plan and prognosis, orders of diagnostic tests, medication and/or appropriate procedures and documentation in the medical records of today's visit.

## 2022-03-04 ENCOUNTER — NON-APPOINTMENT (OUTPATIENT)
Age: 77
End: 2022-03-04

## 2022-03-14 ENCOUNTER — APPOINTMENT (OUTPATIENT)
Dept: ORTHOPEDIC SURGERY | Facility: CLINIC | Age: 77
End: 2022-03-14
Payer: MEDICARE

## 2022-03-14 VITALS — BODY MASS INDEX: 29.8 KG/M2 | WEIGHT: 220 LBS | HEIGHT: 72 IN

## 2022-03-14 PROCEDURE — 29130 APPL FINGER SPLINT STATIC: CPT | Mod: F4

## 2022-03-14 PROCEDURE — 99214 OFFICE O/P EST MOD 30 MIN: CPT | Mod: 25

## 2022-03-14 PROCEDURE — 73140 X-RAY EXAM OF FINGER(S): CPT | Mod: F4

## 2022-03-14 NOTE — HISTORY OF PRESENT ILLNESS
[FreeTextEntry1] : 4 weeks status post left little finger injury resulting in left little finger mallet avulsion fracture.\par \par See note from when he was seen in the office 2 weeks ago.  He was referred for a splint\par \par He is wearing the splint since the day after the visit. He rates his pain a 2 out of 10 at this time.

## 2022-03-14 NOTE — DISCUSSION/SUMMARY
[FreeTextEntry1] : I had a discussion regarding today's visit, the diagnosis and treatment recommendations and options.  We also discussed changes since the last visit.  \par \par I discussed the radiographic findings with him.  I did tell him that I am not certain that the fracture will heal without some loss of extension at the DIP joint.  We discussed treatment options.  At this time he has agreed to proceed with continue nonoperative management which is what I would recommend.  He does understand that he may have some permanent loss of extension at the DIP joint.\par \par He was fitted with a new stack splint which better protected the finger.  He will keep this on full-time.  He will follow up in 2 weeks.\par \par The patient has agreed to the above plan of management and has expressed full understanding.  All questions were fully answered to the patient's satisfaction.\par \par My cumulative time spent on today's visit was greater than 30 minutes and included: Preparation for the visit, review of the medical records, review of pertinent diagnostic studies, examination and counseling of the patient on the above diagnosis, treatment plan and prognosis, orders of diagnostic tests, medications and/or appropriate procedures and documentation in the medical records of today's visit.

## 2022-03-14 NOTE — PHYSICAL EXAM
[de-identified] : - Constitutional: This is a male in no obvious distress.  \par - Psych: Patient is alert and oriented to person, place and time.  Patient has a normal mood and affect.\par - Cardiovascular: Normal pulses throughout the upper extremities.  No significant varicosities are noted in the upper extremities. \par - Neuro: Strength and sensation are intact throughout the upper extremities.  Patient has normal coordination.\par - Respiratory:  Patient exhibits no evidence of shortness of breath or difficulty breathing.\par - Skin: No rashes, lesions, or other abnormalities are noted in the upper extremities.\par \par ---\par \par Examination of his left little finger demonstrates his splint to be early fitting.  The DIP joint remains flexed.  It was removed.  There is no skin breakdown.  There is no residual mallet deformity.  He remains neurovascularly intact distally. [de-identified] : AP, lateral, and oblique radiographs of his left little finger demonstrate his mallet avulsion fracture to be in similar alignment.  There is some displacement and gapping at the articular surface.  There is no subluxation at the DIP joint.

## 2022-03-14 NOTE — ADDENDUM
[FreeTextEntry1] : I, Jorge Chaudhry, acted solely as a scribe for Dr. Prasad on this date on 03/14/2022.

## 2022-03-14 NOTE — END OF VISIT
[FreeTextEntry3] : This note was written by Jorge Chaudhry on 03/14/2022 acting solely as a scribe for Dr. Attila Prasad.\par  \par All medical record entries made by the Scribe were at my, Dr. Attila Prasad, direction and personally dictated by me on 03/14/2022. I have personally reviewed the chart and agree that the record accurately reflects my personal performance of the history, physical exam.

## 2022-03-19 ENCOUNTER — NON-APPOINTMENT (OUTPATIENT)
Age: 77
End: 2022-03-19

## 2022-03-28 ENCOUNTER — APPOINTMENT (OUTPATIENT)
Dept: ORTHOPEDIC SURGERY | Facility: CLINIC | Age: 77
End: 2022-03-28
Payer: MEDICARE

## 2022-03-28 PROCEDURE — 99213 OFFICE O/P EST LOW 20 MIN: CPT

## 2022-03-28 PROCEDURE — 73140 X-RAY EXAM OF FINGER(S): CPT | Mod: F4

## 2022-03-28 RX ORDER — AMMONIUM LACTATE 12 %
12 CREAM (GRAM) TOPICAL
Qty: 385 | Refills: 0 | Status: ACTIVE | COMMUNITY
Start: 2022-03-09

## 2022-03-28 NOTE — ADDENDUM
[FreeTextEntry1] : I, Jorge Chaudhry, acted solely as a scribe for Dr. Prasad on this date on 03/28/2022.

## 2022-03-28 NOTE — DISCUSSION/SUMMARY
[FreeTextEntry1] : I had a discussion regarding today's visit, the diagnosis and treatment recommendations and options.  We also discussed changes since the last visit.  \par \par I discussed the radiographic findings with him.  I did tell him that I am not certain that the fracture will heal without some loss of extension at the DIP joint.  We discussed treatment options.  At this time he has agreed to proceed with continue nonoperative management which is what I would recommend.  He does understand that he may have some permanent loss of extension at the DIP joint.\par \par The splint was replaced and he was instructed on full-time splinting.  He will follow-up in 3 weeks.\par \par The patient has agreed to the above plan of management and has expressed full understanding.  All questions were fully answered to the patient's satisfaction.\par \par My cumulative time spent on today's visit was greater than 30 minutes and included: Preparation for the visit, review of the medical records, review of pertinent diagnostic studies, examination and counseling of the patient on the above diagnosis, treatment plan and prognosis, orders of diagnostic tests, medications and/or appropriate procedures and documentation in the medical records of today's visit.

## 2022-03-28 NOTE — END OF VISIT
[FreeTextEntry3] : This note was written by Jorge Chaudhry on 03/28/2022 acting solely as a scribe for Dr. Attila Prasad.\par  \par All medical record entries made by the Scribe were at my, Dr. Attila Prasad, direction and personally dictated by me on 03/28/2022. I have personally reviewed the chart and agree that the record accurately reflects my personal performance of the history, physical exam.

## 2022-03-28 NOTE — HISTORY OF PRESENT ILLNESS
[FreeTextEntry1] : 6 weeks status post left little finger injury resulting in left little finger mallet avulsion fracture.\par \par See note from when he was seen in the office 2 weeks ago.  He agreed to proceed with nonoperative management.\par \par He is wearing a splint since his last visit. He rates his pain a 2 out of 10 at this time.

## 2022-03-28 NOTE — PHYSICAL EXAM
[de-identified] : - Constitutional: This is a male in no obvious distress.  \par - Psych: Patient is alert and oriented to person, place and time.  Patient has a normal mood and affect.\par - Cardiovascular: Normal pulses throughout the upper extremities.  No significant varicosities are noted in the upper extremities. \par - Neuro: Strength and sensation are intact throughout the upper extremities.  Patient has normal coordination.\par - Respiratory:  Patient exhibits no evidence of shortness of breath or difficulty breathing.\par - Skin: No rashes, lesions, or other abnormalities are noted in the upper extremities.\par \par ---\par \par Examination of his left little finger demonstrates his splint to be well fitting.  It was removed.  He has loss of the terminal 20 degrees of DIP extension.  There is no skin breakdown.  He remains neurovascularly intact distally. [de-identified] : Repeat AP, lateral, and oblique radiographs of his left little finger demonstrate his mallet avulsion fracture to be in similar alignment.  There is some displacement and gapping at the articular surface.  There is no subluxation at the DIP joint.

## 2022-04-05 ENCOUNTER — TRANSCRIPTION ENCOUNTER (OUTPATIENT)
Age: 77
End: 2022-04-05

## 2022-04-15 ENCOUNTER — NON-APPOINTMENT (OUTPATIENT)
Age: 77
End: 2022-04-15

## 2022-04-25 ENCOUNTER — APPOINTMENT (OUTPATIENT)
Dept: ORTHOPEDIC SURGERY | Facility: CLINIC | Age: 77
End: 2022-04-25
Payer: MEDICARE

## 2022-04-25 VITALS — BODY MASS INDEX: 29.8 KG/M2 | WEIGHT: 220 LBS | HEIGHT: 72 IN

## 2022-04-25 PROCEDURE — 73140 X-RAY EXAM OF FINGER(S): CPT | Mod: F4,LT

## 2022-04-25 PROCEDURE — 99213 OFFICE O/P EST LOW 20 MIN: CPT

## 2022-04-28 ENCOUNTER — NON-APPOINTMENT (OUTPATIENT)
Age: 77
End: 2022-04-28

## 2022-05-09 ENCOUNTER — APPOINTMENT (OUTPATIENT)
Dept: ORTHOPEDIC SURGERY | Facility: CLINIC | Age: 77
End: 2022-05-09
Payer: MEDICARE

## 2022-05-09 VITALS — WEIGHT: 220 LBS | HEIGHT: 72 IN | BODY MASS INDEX: 29.8 KG/M2

## 2022-05-09 PROCEDURE — 73140 X-RAY EXAM OF FINGER(S): CPT | Mod: F4

## 2022-05-09 PROCEDURE — 99213 OFFICE O/P EST LOW 20 MIN: CPT

## 2022-05-09 NOTE — DISCUSSION/SUMMARY
[FreeTextEntry1] : I had a discussion regarding today's visit, the diagnosis and treatment recommendations and options.  We also discussed changes since the last visit.  At this time, I recommended range of motion exercises.  He understands that he will have loss of terminal extension of the little finger.  If he notices recurrence of the mallet deformity, then he will return to the office.  He does understand that the fracture has not fully healed.  I discussed the possibility that it may heal with a fibrous union or it may take another 2 to 3 months to heal radiographically.  If he continues to improve, then he does not need to follow-up, however, if he is having persistent pain, swelling or recurrence of the mild deformity, then he will return to the office.\par \par The patient has agreed to the above plan of management and has expressed full understanding.  All questions were fully answered to the patient's satisfaction.\par \par My cumulative time spent on today's visit was greater than 30 minutes and included: Preparation for the visit, review of the medical records, review of pertinent diagnostic studies, examination and counseling of the patient on the above diagnosis, treatment plan and prognosis, orders of diagnostic tests, medications and/or appropriate procedures and documentation in the medical records of today's visit.

## 2022-05-09 NOTE — HISTORY OF PRESENT ILLNESS
[FreeTextEntry1] : 12 weeks status post left little finger injury resulting in left little finger mallet avulsion fracture.\par \par See note from when he was seen in the office 2 weeks ago.  I recommended weaning off of the splint.\par \par He returns today with left little finger pain. He notes the pain to be minimal and has no difficulty moving the thumb. He also complains of pain in his left thumb that started today.

## 2022-05-09 NOTE — PHYSICAL EXAM
[de-identified] : - Constitutional: This is a male in no obvious distress.  \par - Psych: Patient is alert and oriented to person, place and time.  Patient has a normal mood and affect.\par - Cardiovascular: Normal pulses throughout the upper extremities.  No significant varicosities are noted in the upper extremities. \par - Neuro: Strength and sensation are intact throughout the upper extremities.  Patient has normal coordination.\par - Respiratory:  Patient exhibits no evidence of shortness of breath or difficulty breathing.\par - Skin: No rashes, lesions, or other abnormalities are noted in the upper extremities.\par \par ---\par \par Examination of his left little finger demonstrates his splint to be well fitting.  It was removed.  He has loss of the terminal 10 degrees of DIP extension.  There is active pull-through of the terminal extensor tendon.  There is no skin breakdown.  He remains neurovascularly intact distally. [de-identified] : Repeat AP, lateral, and oblique radiographs of his left little finger demonstrate similar alignment of his fracture.  Again, the overall alignment is acceptable.  There is very early healing.

## 2022-05-09 NOTE — END OF VISIT
[FreeTextEntry3] : This note was written by Jorge Chaudhry on 04/25/2022 acting solely as a scribe for Dr. Attila Prasad.\par  \par All medical record entries made by the Scribe were at my, Dr. Attila Prasad, direction and personally dictated by me on 04/25/2022. I have personally reviewed the chart and agree that the record accurately reflects my personal performance of the history, physical exam.

## 2022-05-09 NOTE — PHYSICAL EXAM
[de-identified] : - Constitutional: This is a male in no obvious distress.  \par - Psych: Patient is alert and oriented to person, place and time.  Patient has a normal mood and affect.\par - Cardiovascular: Normal pulses throughout the upper extremities.  No significant varicosities are noted in the upper extremities. \par - Neuro: Strength and sensation are intact throughout the upper extremities.  Patient has normal coordination.\par - Respiratory:  Patient exhibits no evidence of shortness of breath or difficulty breathing.\par - Skin: No rashes, lesions, or other abnormalities are noted in the upper extremities.\par \par ---\par \par Examination of his left little finger demonstrates residual swelling along the dorsal aspect of the DIP joint.  Has loss of the terminal 20 degrees of DIP extension.  There is active pull-through of the terminal extensor tendon.  He has improved flexion into the palm.  He has mild tenderness along the CMC joint of the left thumb and along the thenar muscles without obvious swelling.  There is no evidence of a trigger thumb.  He remains neurovascularly intact distally. [de-identified] : Repeat AP, lateral, and oblique radiographs of his left little finger demonstrate similar alignment of his fracture.  Again, the overall alignment is acceptable.  There is early healing, but the fracture has not fully consolidated.

## 2022-05-09 NOTE — ADDENDUM
[FreeTextEntry1] : I, Jorge Chaudhry, acted solely as a scribe for Dr. Prasad on this date on 05/09/2022.

## 2022-05-09 NOTE — HISTORY OF PRESENT ILLNESS
[FreeTextEntry1] : 10 weeks status post left little finger injury resulting in left little finger mallet avulsion fracture.\par \par He is wearing a splint. He denies pain as well as numbness and tingling.

## 2022-05-09 NOTE — END OF VISIT
[FreeTextEntry3] : This note was written by Jorge Chaudhry on 05/09/2022 acting solely as a scribe for Dr. Attila Prasad.\par  \par All medical record entries made by the Scribe were at my, Dr. Attila Prasad, direction and personally dictated by me on 05/09/2022. I have personally reviewed the chart and agree that the record accurately reflects my personal performance of the history, physical exam.

## 2022-05-09 NOTE — DISCUSSION/SUMMARY
[FreeTextEntry1] : I had a discussion regarding today's visit, the diagnosis and treatment recommendations and options.  We also discussed changes since the last visit.  At this time, I recommended he continued to wear the splint at night and as needed throughout the day. He was instructed gentle movement of his finger. He will follow up in 2 weeks to assess his progress.\par \par I did tell him that if he notices any recurrence of the mallet deformity, then he needs to restart 2 weeks of full-time splinting and follow-up in 2 weeks.\par \par The patient has agreed to the above plan of management and has expressed full understanding.  All questions were fully answered to the patient's satisfaction.\par \par My cumulative time spent on today's visit was greater than 30 minutes and included: Preparation for the visit, review of the medical records, review of pertinent diagnostic studies, examination and counseling of the patient on the above diagnosis, treatment plan and prognosis, orders of diagnostic tests, medications and/or appropriate procedures and documentation in the medical records of today's visit.

## 2022-06-13 ENCOUNTER — APPOINTMENT (OUTPATIENT)
Dept: CARDIOLOGY | Facility: CLINIC | Age: 77
End: 2022-06-13
Payer: MEDICARE

## 2022-06-13 ENCOUNTER — NON-APPOINTMENT (OUTPATIENT)
Age: 77
End: 2022-06-13

## 2022-06-13 VITALS
DIASTOLIC BLOOD PRESSURE: 60 MMHG | WEIGHT: 220 LBS | HEART RATE: 59 BPM | SYSTOLIC BLOOD PRESSURE: 124 MMHG | BODY MASS INDEX: 29.8 KG/M2 | HEIGHT: 72 IN | OXYGEN SATURATION: 96 % | RESPIRATION RATE: 12 BRPM | TEMPERATURE: 97.1 F

## 2022-06-13 PROCEDURE — 93000 ELECTROCARDIOGRAM COMPLETE: CPT

## 2022-06-13 PROCEDURE — 99214 OFFICE O/P EST MOD 30 MIN: CPT

## 2022-06-13 RX ORDER — SOLIFENACIN SUCCINATE 5 MG/1
5 TABLET ORAL
Qty: 30 | Refills: 0 | Status: DISCONTINUED | COMMUNITY
Start: 2021-10-04 | End: 2022-06-13

## 2022-06-13 RX ORDER — SILDENAFIL 25 MG/1
25 TABLET ORAL
Refills: 0 | Status: DISCONTINUED | COMMUNITY
Start: 2021-06-24 | End: 2022-06-13

## 2022-06-13 RX ORDER — SODIUM SULFATE, POTASSIUM SULFATE, MAGNESIUM SULFATE 17.5; 3.13; 1.6 G/ML; G/ML; G/ML
17.5-3.13-1.6 SOLUTION, CONCENTRATE ORAL
Qty: 354 | Refills: 0 | Status: DISCONTINUED | COMMUNITY
Start: 2021-11-30 | End: 2022-06-13

## 2022-06-13 RX ORDER — BICALUTAMIDE 50 MG/1
50 TABLET ORAL DAILY
Refills: 0 | Status: DISCONTINUED | COMMUNITY
End: 2022-06-13

## 2022-06-13 RX ORDER — SILDENAFIL 50 MG/1
50 TABLET ORAL
Qty: 111 | Refills: 0 | Status: DISCONTINUED | COMMUNITY
Start: 2021-04-14 | End: 2022-06-13

## 2022-06-13 RX ORDER — OMEPRAZOLE 40 MG/1
40 CAPSULE, DELAYED RELEASE ORAL
Qty: 30 | Refills: 0 | Status: DISCONTINUED | COMMUNITY
Start: 2021-11-30 | End: 2022-06-13

## 2022-06-13 NOTE — DISCUSSION/SUMMARY
[FreeTextEntry1] : \par - s/p DIP fracture resulting in mallet finger.  Consulted orthopedic -Dr. Prasad\par - Solitary varicose vein right forearm; if it increases in size, refer to vascular; so far it has been asymptomatic and stable\par - Overweight: Patient trying to reduce weight.  He had lost 16 pounds in the past year.  In the past 8 months it has increased since he quit smoking ; also reduce high glycemic foods and saturated fats. \par - CT FU with Dr. Navarro and [facility]. \par -Follow-up with GI -he follows Dr. Crawford tomorrow.  He does have some reflux symptoms after taking Motrin.\par -Patient has quit Cigar smoking. He is following pulmonary -Dr. Mills\par - The patient does have family history of prostate cancer.- His son had it \par -Slight pedal edema.\par \par - Primary care: -Reminded him of eye exam this year.  Continue follow-up with GI as needed\par - He had 2nd pneumonia 2019.  He had shingles vaccination 4 to 5 years ago.  Reminded him regarding flu vaccination.  He has Covid vaccination.\par -Routine yearly lab slip was given to the patient along with UA

## 2022-06-13 NOTE — HISTORY OF PRESENT ILLNESS
[FreeTextEntry1] : Dedrick is a pleasant 76-year-old male With history of overweight., hypertension, dyslipidemia, mild mitral valve prolapse, Prediabetes, EDS \par \par Is fairly active without any cardiac symptoms.\par \par No further falls since May 2022\par \par Prior HX: \par ASx for high T. Sujit on labs In the past. He has mild Polycythemia with mildly low plt  & T. Sujit is borderline high. He is ASx. He follows Dr. Solorio  \par \par HTN is chronic, controlled with meds and ASx. \par \par He as Mild pre-diabetes.  He has gained some weight since the last office visit.\par \par No history of MI, CHF, CVA, cardiac syncope, angina, chronic renal disease. No shortness of breath\par \par He had high PSA - chronic, seeing specialist at [facility] -on radiation treatment and hormone therapy for prostate cancer.  No Urinary Sx. PSA has come down significantly. \par \par Denies any palpitations, shortness of breath, PND, orthopnea.  \par \par

## 2022-06-13 NOTE — PHYSICAL EXAM
[General Appearance - Well Developed] : well developed [Normal Appearance] : normal appearance [Well Groomed] : well groomed [General Appearance - Well Nourished] : well nourished [No Deformities] : no deformities [Normal Conjunctiva] : the conjunctiva exhibited no abnormalities [General Appearance - In No Acute Distress] : no acute distress [Eyelids - No Xanthelasma] : the eyelids demonstrated no xanthelasmas [Respiration, Rhythm And Depth] : normal respiratory rhythm and effort [Exaggerated Use Of Accessory Muscles For Inspiration] : no accessory muscle use [Auscultation Breath Sounds / Voice Sounds] : lungs were clear to auscultation bilaterally [Heart Rate And Rhythm] : heart rate and rhythm were normal [Heart Sounds] : normal S1 and S2 [Murmurs] : no murmurs present [Abdomen Tenderness] : non-tender [Abdomen Soft] : soft [Abnormal Walk] : normal gait [Gait - Sufficient For Exercise Testing] : the gait was sufficient for exercise testing [Skin Color & Pigmentation] : normal skin color and pigmentation [Skin Turgor] : normal skin turgor [] : no rash [Oriented To Time, Place, And Person] : oriented to person, place, and time [Affect] : the affect was normal [Mood] : the mood was normal [No Anxiety] : not feeling anxious [FreeTextEntry1] : Volar displacement of Lt 5th finger DIP

## 2022-06-13 NOTE — ASSESSMENT
[FreeTextEntry1] : Reviewed previously\par - Ultrasound of the abdomen Aug 2015 showed renal cyst in the lower pole on the right side colon, 7 x 8.4 cm.\par - ETT May 2016: Exercise 7 minutes Augusto protocol.  No ischemia or arrhythmia.\par - EKG 08/31/17: Sinus bradycardia. Right bundle-branch block. No ST change.\par - Labs January 2019: Hemoglobin 16.8. Normal white count and platelets 141. . Otherwise normal CMP. Normal CK\par - Labs January 2018: A1c 5.7, . PSA 4.11.TSH 2.24. Vitamin D pending\par - Labs September 2019: LDL 66. Normal A1c, TSH\par \par Reviewed today:\par -Carotid ultrasound November 2019: Atherosclerosis without stenosis\par -Echocardiogram November 2019: Normal LV function and wall motion. Normal PASP.  Mild diastolic dysfunction.\par -ETT June 2020: Exercise 6 minutes on Augusto protocol.  He had atypical mild chest pain during exercise ischemic EKG changes\par -Nuclear stress test June 2020: Diaphragmatic attenuation.  No ischemia.\par -Labs June 2021: .  Normal LFT.  LDL 76\par -Echo March 2021

## 2022-06-17 ENCOUNTER — LABORATORY RESULT (OUTPATIENT)
Age: 77
End: 2022-06-17

## 2022-06-17 ENCOUNTER — APPOINTMENT (OUTPATIENT)
Dept: FAMILY MEDICINE | Facility: CLINIC | Age: 77
End: 2022-06-17
Payer: MEDICARE

## 2022-06-17 PROCEDURE — 36415 COLL VENOUS BLD VENIPUNCTURE: CPT

## 2022-07-11 ENCOUNTER — NON-APPOINTMENT (OUTPATIENT)
Age: 77
End: 2022-07-11

## 2022-08-26 ENCOUNTER — NON-APPOINTMENT (OUTPATIENT)
Age: 77
End: 2022-08-26

## 2022-12-05 ENCOUNTER — OFFICE (OUTPATIENT)
Dept: URBAN - METROPOLITAN AREA CLINIC 97 | Facility: CLINIC | Age: 77
Setting detail: OPHTHALMOLOGY
End: 2022-12-05
Payer: MEDICARE

## 2022-12-05 ENCOUNTER — APPOINTMENT (OUTPATIENT)
Dept: CARDIOLOGY | Facility: CLINIC | Age: 77
End: 2022-12-05

## 2022-12-05 VITALS
HEIGHT: 72 IN | DIASTOLIC BLOOD PRESSURE: 62 MMHG | WEIGHT: 223 LBS | BODY MASS INDEX: 30.2 KG/M2 | HEART RATE: 63 BPM | RESPIRATION RATE: 12 BRPM | TEMPERATURE: 96.8 F | OXYGEN SATURATION: 97 % | SYSTOLIC BLOOD PRESSURE: 118 MMHG

## 2022-12-05 DIAGNOSIS — H26.491: ICD-10-CM

## 2022-12-05 PROCEDURE — 99213 OFFICE O/P EST LOW 20 MIN: CPT | Performed by: OPHTHALMOLOGY

## 2022-12-05 PROCEDURE — 99215 OFFICE O/P EST HI 40 MIN: CPT

## 2022-12-05 RX ORDER — DOXYCYCLINE HYCLATE 100 MG/1
100 CAPSULE ORAL
Qty: 2 | Refills: 0 | Status: DISCONTINUED | COMMUNITY
Start: 2022-06-23 | End: 2022-12-05

## 2022-12-05 ASSESSMENT — KERATOMETRY
OD_K2POWER_DIOPTERS: 43.00
OS_K2POWER_DIOPTERS: 43.00
METHOD_AUTO_MANUAL: AUTO
OD_AXISANGLE_DEGREES: 002
OS_K1POWER_DIOPTERS: 43.00
OD_K1POWER_DIOPTERS: 42.50
OS_AXISANGLE_DEGREES: 090

## 2022-12-05 ASSESSMENT — REFRACTION_AUTOREFRACTION
OD_AXIS: 030
OD_SPHERE: -0.25
OS_SPHERE: -0.25
OS_CYLINDER: +0.75
OS_AXIS: 164
OD_CYLINDER: +0.75

## 2022-12-05 ASSESSMENT — REFRACTION_MANIFEST
OD_CYLINDER: +0.25
OD_VA1: 20/20
OD_SPHERE: +0.25
OS_VA2: 20/20(J1+)
OU_VA: 20/20
OS_AXIS: 160
OD_AXIS: 030
OS_CYLINDER: +0.50
OS_CYLINDER: SPH
OS_VA2: 20/20(J1+)
OD_VA2: 20/20(J1+)
OS_SPHERE: PLANO
OD_ADD: +2.75
OD_VA1: 20/20
OD_CYLINDER: +0.50
OS_SPHERE: PLANO
OS_ADD: +2.75
OS_VA1: 20/20
OD_SPHERE: PLANO
OS_VA1: 20/20
OS_ADD: +2.25
OD_AXIS: 010
OU_VA: 20/20
OD_VA2: 20/20(J1+)
OD_ADD: +2.25

## 2022-12-05 ASSESSMENT — SPHEQUIV_DERIVED
OD_SPHEQUIV: 0.125
OD_SPHEQUIV: 0.5
OS_SPHEQUIV: 0.125

## 2022-12-05 ASSESSMENT — REFRACTION_CURRENTRX
OD_AXIS: 153
OS_OVR_VA: 20/
OD_VPRISM_DIRECTION: SV
OD_CYLINDER: SPH
OS_CYLINDER: +0.25
OS_CYLINDER: SPH
OD_VPRISM_DIRECTION: SV
OD_CYLINDER: +0.75
OS_VPRISM_DIRECTION: SV
OS_SPHERE: +2.50
OD_SPHERE: +2.50
OD_OVR_VA: 20/
OD_OVR_VA: 20/
OD_SPHERE: -1.00
OS_AXIS: 038
OS_SPHERE: -0.75
OS_VPRISM_DIRECTION: SV
OS_OVR_VA: 20/

## 2022-12-05 ASSESSMENT — AXIALLENGTH_DERIVED
OD_AL: 23.8209
OS_AL: 23.7276
OD_AL: 23.6729

## 2022-12-05 ASSESSMENT — CONFRONTATIONAL VISUAL FIELD TEST (CVF)
OD_FINDINGS: FULL
OS_FINDINGS: FULL

## 2022-12-05 ASSESSMENT — VISUAL ACUITY
OS_BCVA: 20/30-
OD_BCVA: 20/20-

## 2022-12-05 ASSESSMENT — SUPERFICIAL PUNCTATE KERATITIS (SPK)
OD_SPK: 1+
OS_SPK: 1+

## 2022-12-05 NOTE — DISCUSSION/SUMMARY
[FreeTextEntry1] : \par - s/p DIP fracture resulting in mallet finger.  Consulted orthopedic -Dr. Prasad\par - Solitary varicose vein right forearm; if it increases in size, refer to vascular; so far it has been asymptomatic and stable\par - Overweight: Patient trying to reduce weight.  He had lost 16 pounds in the past year.  In the past 8 months it has increased ; also reduce high glycemic foods and saturated fats. \par - CT FU with Dr. Navarro and [facility].  Also with endocrine for breast lump\par -Right eye blurriness due to film.  Follow-up with site MD.\par -Elevated bilirubin.  Discomfort in right upper quadrant?  Follow-up with GI -Dr. Yvette BROWN\par -Follow-up with breast surgeon with history of breast lump.  Patient with history of prostate cancer which was treated with hormones.  He is following endocrine also\par -Patient has quit Cigar smoking. He is following pulmonary -Dr. Mills\par \par - Primary care: \par - He had 2nd pneumonia vacc. 2019.  He had shingles vaccination 4 to 5 years ago.  Reminded him regarding flu vaccination.  He has Covid vaccination.\par

## 2022-12-05 NOTE — ASSESSMENT
[FreeTextEntry1] : Reviewed previously\par - Ultrasound of the abdomen Aug 2015 showed renal cyst in the lower pole on the right side colon, 7 x 8.4 cm.\par - ETT May 2016: Exercise 7 minutes Augusto protocol.  No ischemia or arrhythmia.\par - EKG 08/31/17: Sinus bradycardia. Right bundle-branch block. No ST change.\par - Labs January 2019: Hemoglobin 16.8. Normal white count and platelets 141. . Otherwise normal CMP. Normal CK\par - Labs January 2018: A1c 5.7, . PSA 4.11.TSH 2.24. Vitamin D pending\par - Labs September 2019: LDL 66. Normal A1c, TSH\par \par Reviewed today:\par -Carotid ultrasound November 2019: Atherosclerosis without stenosis\par -Echocardiogram November 2019: Normal LV function and wall motion. Normal PASP.  Mild diastolic dysfunction.\par -ETT June 2020: Exercise 6 minutes on Augusto protocol.  He had atypical mild chest pain during exercise ischemic EKG changes\par -Nuclear stress test June 2020: Diaphragmatic attenuation.  No ischemia.\par -Labs June 2021: .  Normal LFT.  LDL 76\par -Echo March 2021\par -Labs November 2022: LDL 64.  Normal electrolytes and creatinine.  Total bilirubin 1.6.

## 2022-12-05 NOTE — HISTORY OF PRESENT ILLNESS
[FreeTextEntry1] : Dedrick is a pleasant 77-year-old male With history of overweight., hypertension, dyslipidemia, mild mitral valve prolapse, Prediabetes, EDS \par \par Is fairly active without any cardiac symptoms.\par \par No further falls since May 2022\par \par Prior HX: \par ASx for high T. Sujit on labs In the past. He has mild Polycythemia with mildly low plt  & T. Sujit is borderline high. He is ASx. He follows Dr. Solorio. \par \par Recently, breast lump was noted.  He consulted Dr. Gustavo Beckwith.  Mammogram and ultrasound showed benign lump.  Endocrine was consulted.  He had a transient elevation of prolactin hormone which has now normalized?\par \par HTN is chronic, controlled with meds and ASx. \par \par He as Mild pre-diabetes.  He has gained some weight since the last office visit.\par \par No history of MI, CHF, CVA, cardiac syncope, angina, chronic renal disease. No shortness of breath\par \par He had high PSA - chronic, seeing specialist at [facility] -on radiation treatment and hormone therapy for prostate cancer.  No Urinary Sx. PSA has come down significantly. \par \par Denies any palpitations, shortness of breath, PND, orthopnea.  \par \par

## 2022-12-30 ENCOUNTER — OFFICE (OUTPATIENT)
Dept: URBAN - METROPOLITAN AREA CLINIC 97 | Facility: CLINIC | Age: 77
Setting detail: OPHTHALMOLOGY
End: 2022-12-30
Payer: MEDICARE

## 2022-12-30 ENCOUNTER — RX ONLY (RX ONLY)
Age: 77
End: 2022-12-30

## 2022-12-30 DIAGNOSIS — H26.491: ICD-10-CM

## 2022-12-30 PROBLEM — H16.221 DRY EYE SYNDROME K SICCA;  , RIGHT EYE: Status: ACTIVE | Noted: 2022-12-30

## 2022-12-30 PROCEDURE — 66821 AFTER CATARACT LASER SURGERY: CPT | Performed by: OPHTHALMOLOGY

## 2022-12-30 ASSESSMENT — CONFRONTATIONAL VISUAL FIELD TEST (CVF)
OD_FINDINGS: FULL
OS_FINDINGS: FULL

## 2022-12-30 ASSESSMENT — KERATOMETRY
OS_K1POWER_DIOPTERS: 43.00
METHOD_AUTO_MANUAL: AUTO
OD_K1POWER_DIOPTERS: 42.50
OD_AXISANGLE_DEGREES: 002
OS_AXISANGLE_DEGREES: 090
OD_K2POWER_DIOPTERS: 43.00
OS_K2POWER_DIOPTERS: 43.00

## 2022-12-30 ASSESSMENT — REFRACTION_CURRENTRX
OS_OVR_VA: 20/
OD_OVR_VA: 20/
OS_CYLINDER: SPH
OS_VPRISM_DIRECTION: SV
OS_VPRISM_DIRECTION: SV
OS_SPHERE: -0.75
OS_OVR_VA: 20/
OD_SPHERE: -1.00
OD_CYLINDER: +0.75
OS_AXIS: 038
OD_CYLINDER: SPH
OD_SPHERE: +2.50
OD_AXIS: 153
OD_OVR_VA: 20/
OS_CYLINDER: +0.25
OS_SPHERE: +2.50
OD_VPRISM_DIRECTION: SV
OD_VPRISM_DIRECTION: SV

## 2022-12-30 ASSESSMENT — REFRACTION_MANIFEST
OS_ADD: +2.25
OS_AXIS: 160
OU_VA: 20/20
OD_VA2: 20/20(J1+)
OU_VA: 20/20
OD_VA1: 20/20
OD_SPHERE: PLANO
OS_VA2: 20/20(J1+)
OS_SPHERE: PLANO
OS_CYLINDER: +0.50
OS_VA1: 20/20
OD_VA2: 20/20(J1+)
OS_CYLINDER: SPH
OD_AXIS: 030
OD_SPHERE: +0.25
OD_CYLINDER: +0.50
OD_ADD: +2.75
OS_VA1: 20/20
OS_SPHERE: PLANO
OD_ADD: +2.25
OS_ADD: +2.75
OD_AXIS: 010
OD_VA1: 20/20
OS_VA2: 20/20(J1+)
OD_CYLINDER: +0.25

## 2022-12-30 ASSESSMENT — REFRACTION_AUTOREFRACTION
OD_AXIS: 030
OS_AXIS: 164
OD_SPHERE: -0.25
OS_SPHERE: -0.25
OS_CYLINDER: +0.75
OD_CYLINDER: +0.75

## 2022-12-30 ASSESSMENT — SUPERFICIAL PUNCTATE KERATITIS (SPK)
OD_SPK: 1+
OS_SPK: 1+

## 2022-12-30 ASSESSMENT — AXIALLENGTH_DERIVED
OD_AL: 23.6729
OS_AL: 23.7276
OD_AL: 23.8209

## 2022-12-30 ASSESSMENT — SPHEQUIV_DERIVED
OD_SPHEQUIV: 0.125
OD_SPHEQUIV: 0.5
OS_SPHEQUIV: 0.125

## 2022-12-30 ASSESSMENT — VISUAL ACUITY
OS_BCVA: 20/30-
OD_BCVA: 20/20-

## 2023-01-13 ENCOUNTER — APPOINTMENT (OUTPATIENT)
Dept: CARDIOLOGY | Facility: CLINIC | Age: 78
End: 2023-01-13
Payer: MEDICARE

## 2023-01-13 VITALS
TEMPERATURE: 96.9 F | HEART RATE: 80 BPM | BODY MASS INDEX: 29.66 KG/M2 | RESPIRATION RATE: 12 BRPM | OXYGEN SATURATION: 99 % | DIASTOLIC BLOOD PRESSURE: 74 MMHG | SYSTOLIC BLOOD PRESSURE: 122 MMHG | WEIGHT: 219 LBS | HEIGHT: 72 IN

## 2023-01-13 PROCEDURE — 99214 OFFICE O/P EST MOD 30 MIN: CPT

## 2023-01-13 NOTE — PHYSICAL EXAM
[General Appearance - Well Developed] : well developed [Normal Appearance] : normal appearance [Well Groomed] : well groomed [General Appearance - Well Nourished] : well nourished [No Deformities] : no deformities [General Appearance - In No Acute Distress] : no acute distress [Normal Conjunctiva] : the conjunctiva exhibited no abnormalities [Eyelids - No Xanthelasma] : the eyelids demonstrated no xanthelasmas [Respiration, Rhythm And Depth] : normal respiratory rhythm and effort [Exaggerated Use Of Accessory Muscles For Inspiration] : no accessory muscle use [Auscultation Breath Sounds / Voice Sounds] : lungs were clear to auscultation bilaterally [Heart Rate And Rhythm] : heart rate and rhythm were normal [Heart Sounds] : normal S1 and S2 [Murmurs] : no murmurs present [Abdomen Soft] : soft [Abdomen Tenderness] : non-tender [Abnormal Walk] : normal gait [Gait - Sufficient For Exercise Testing] : the gait was sufficient for exercise testing [FreeTextEntry1] : Volar displacement of Lt 5th finger DIP [Skin Color & Pigmentation] : normal skin color and pigmentation [Skin Turgor] : normal skin turgor [] : no rash [Oriented To Time, Place, And Person] : oriented to person, place, and time [Affect] : the affect was normal [Mood] : the mood was normal [No Anxiety] : not feeling anxious

## 2023-01-13 NOTE — HISTORY OF PRESENT ILLNESS
[FreeTextEntry1] : Dedrick is a pleasant 77-year-old male With history of overweight., hypertension, dyslipidemia, mild mitral valve prolapse, Prediabetes, EDS \par \par No further falls since May 2022\par \par Prior HX: \par ASx for high T. Sujit on labs In the past. He has mild Polycythemia with mildly low plt  & T. Sujit is borderline high. He is ASx. He follows Dr. Solorio. \par \par Recently, breast lump was noted.  He consulted Dr. Gustavo Beckwith.  Mammogram and ultrasound showed benign lump.  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  Patient is asymptomatic for it.  No chest pain or cough.\par \par Endocrine was consulted.  He had a transient elevation of prolactin hormone which has now normalized?\par \par HTN is chronic, controlled with meds and ASx. \par \par He as Mild pre-diabetes.  He has gained some weight since the last office visit.\par \par No history of MI, CHF, CVA, cardiac syncope, angina, chronic renal disease. No shortness of breath\par \par He had high PSA - chronic, seeing specialist at [facility] -on radiation treatment and hormone therapy for prostate cancer.  No Urinary Sx. PSA has come down significantly. \par \par Denies any palpitations, shortness of breath, PND, orthopnea.  \par \par

## 2023-01-13 NOTE — DISCUSSION/SUMMARY
[FreeTextEntry1] : \par - s/p DIP fracture resulting in mallet finger.  Consulted orthopedic -Dr. Prasad\par - Solitary varicose vein right forearm; if it increases in size, refer to vascular; so far it has been asymptomatic and stable\par - Overweight: Patient trying to reduce weight.  He had lost 16 pounds but then he gained some back.  Also reduce high glycemic foods and saturated fats. \par - CT FU  with endocrine for prolactin levels and also breast center for breast lump; patient is now scheduled for CTA of the breast\par - He will follow-up with pulmonary (sees Dr. Arriaga) for the anterior mediastinal mass.\par - Continue follow-up with Dr. Crawford for findings in the liver with MRI\par -Right eye blurriness improved significantly with laser treatment ophthalmology\par -Elevated bilirubin.  Follows GI -Dr. Crawford \par - Patient with history of prostate cancer which was treated with hormones.  He is following endocrine also\par -Patient has quit Cigar smoking.\par \par - Primary care: \par - He had 2nd pneumonia vacc. 2019.  He had shingles vaccination 4 to 5 years ago.  Reminded him regarding flu vaccination.  He has Covid vaccination.\par \par \par Frederick Casanova MD, FACC, TIMA\par

## 2023-01-17 ENCOUNTER — NON-APPOINTMENT (OUTPATIENT)
Age: 78
End: 2023-01-17

## 2023-01-18 ENCOUNTER — OFFICE (OUTPATIENT)
Dept: URBAN - METROPOLITAN AREA CLINIC 97 | Facility: CLINIC | Age: 78
Setting detail: OPHTHALMOLOGY
End: 2023-01-18

## 2023-01-18 DIAGNOSIS — H26.491: ICD-10-CM

## 2023-01-18 PROCEDURE — 99024 POSTOP FOLLOW-UP VISIT: CPT | Performed by: OPHTHALMOLOGY

## 2023-01-18 ASSESSMENT — REFRACTION_MANIFEST
OS_CYLINDER: +0.50
OD_VA1: 20/20
OD_AXIS: 010
OD_ADD: +2.25
OS_VA1: 20/20
OD_SPHERE: PLANO
OU_VA: 20/20
OD_SPHERE: PLANO
OD_CYLINDER: SPH
OU_VA: 20/20
OD_ADD: +3.00
OS_VA1: 20/20
OS_AXIS: 160
OD_VA1: 20/20
OS_SPHERE: PLANO
OD_CYLINDER: +0.25
OD_VA2: 20/20(J1+)
OS_ADD: +2.25
OS_CYLINDER: SPH
OS_VA2: 20/20(J1+)
OS_ADD: +3.00
OS_SPHERE: PLANO

## 2023-01-18 ASSESSMENT — REFRACTION_CURRENTRX
OS_VPRISM_DIRECTION: SV
OD_CYLINDER: +0.75
OD_CYLINDER: SPH
OS_OVR_VA: 20/
OS_OVR_VA: 20/
OD_VPRISM_DIRECTION: SV
OD_OVR_VA: 20/
OD_AXIS: 153
OS_AXIS: 038
OD_SPHERE: -1.00
OS_CYLINDER: SPH
OD_SPHERE: +2.50
OS_VPRISM_DIRECTION: SV
OS_CYLINDER: +0.25
OD_VPRISM_DIRECTION: SV
OS_SPHERE: -0.75
OS_SPHERE: +2.50
OD_OVR_VA: 20/

## 2023-01-18 ASSESSMENT — AXIALLENGTH_DERIVED
OS_AL: 23.5891
OD_AL: 23.8678

## 2023-01-18 ASSESSMENT — REFRACTION_AUTOREFRACTION
OD_SPHERE: -0.25
OS_CYLINDER: +0.75
OD_CYLINDER: +0.75
OD_AXIS: 010
OS_SPHERE: -0.25
OS_AXIS: 005

## 2023-01-18 ASSESSMENT — CONFRONTATIONAL VISUAL FIELD TEST (CVF)
OS_FINDINGS: FULL
OD_FINDINGS: FULL

## 2023-01-18 ASSESSMENT — SUPERFICIAL PUNCTATE KERATITIS (SPK)
OS_SPK: 1+
OD_SPK: 1+

## 2023-01-18 ASSESSMENT — KERATOMETRY
METHOD_AUTO_MANUAL: AUTO
OS_AXISANGLE_DEGREES: 070
OD_K2POWER_DIOPTERS: 43.00
OS_K1POWER_DIOPTERS: 43.25
OD_K1POWER_DIOPTERS: 42.25
OD_AXISANGLE_DEGREES: 005
OS_K2POWER_DIOPTERS: 43.50

## 2023-01-18 ASSESSMENT — SPHEQUIV_DERIVED
OD_SPHEQUIV: 0.125
OS_SPHEQUIV: 0.125

## 2023-01-18 ASSESSMENT — VISUAL ACUITY
OD_BCVA: 20/20
OS_BCVA: 20/20-1

## 2023-06-06 ENCOUNTER — NON-APPOINTMENT (OUTPATIENT)
Age: 78
End: 2023-06-06

## 2023-06-06 ENCOUNTER — APPOINTMENT (OUTPATIENT)
Dept: CARDIOLOGY | Facility: CLINIC | Age: 78
End: 2023-06-06
Payer: MEDICARE

## 2023-06-06 VITALS
RESPIRATION RATE: 12 BRPM | HEIGHT: 72 IN | HEART RATE: 51 BPM | OXYGEN SATURATION: 95 % | DIASTOLIC BLOOD PRESSURE: 68 MMHG | WEIGHT: 217 LBS | BODY MASS INDEX: 29.39 KG/M2 | SYSTOLIC BLOOD PRESSURE: 124 MMHG

## 2023-06-06 PROCEDURE — 93000 ELECTROCARDIOGRAM COMPLETE: CPT

## 2023-06-06 PROCEDURE — 99214 OFFICE O/P EST MOD 30 MIN: CPT

## 2023-06-06 NOTE — HISTORY OF PRESENT ILLNESS
[FreeTextEntry1] : Dedrick is a pleasant 77-year-old male With history of overweight., hypertension, dyslipidemia, mild mitral valve prolapse, Prediabetes, EDS \par \par No further falls since May 2022\par \par Prior HX: \par ASx for high T. Sujit on labs In the past. He has mild Polycythemia with mildly low plt  & T. Sujit is borderline high. He is ASx. He follows Dr. Solorio. \par \par Recently, breast lump was noted.  He consulted Dr. Gustavo Beckwith.  Mammogram and ultrasound showed benign lump.  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  Patient is asymptomatic for it.  No chest pain or cough.\par \par Endocrine was consulted.  He had a transient elevation of prolactin hormone which has now normalized?\par \par HTN is chronic, controlled with meds and ASx. \par \par He as Mild pre-diabetes.  He has gained some weight since the last office visit.\par \par No history of MI, CHF, CVA, cardiac syncope, angina, chronic renal disease. No shortness of breath\par \par He had high PSA - chronic, seeing specialist at [facility] -on radiation treatment and hormone therapy for prostate cancer.  No Urinary Sx. PSA has come down significantly. \par \par Denies any palpitations, shortness of breath, PND, orthopnea.  \par \par

## 2023-06-06 NOTE — DISCUSSION/SUMMARY
[FreeTextEntry1] : \par - s/p DIP fracture resulting in mallet finger.  Consulted orthopedic -Dr. Prasad\par - Solitary varicose vein right forearm; if it increases in size, refer to vascular; so far it has been asymptomatic and stable\par - Overweight: Patient trying to reduce weight.  He had lost 16 pounds but then he gained some back.  Also reduce high glycemic foods and saturated fats.  BS was 115 in November 2022\par - prolactin levels decreased.  Follows endocrine.  Breast lump regressed as per the patient\par - He follows with pulmonary (sees Dr. Arriaga) for the anterior mediastinal mass.\par - Continue follow-up with Dr. Crawford for findings in the liver with MRI\par -Right eye blurriness improved significantly with laser treatment ophthalmology\par -He has a small rough skin area over the left temporal region.  He will consult Dr. Landry\par -Elevated bilirubin.  Follows GI -Dr. Crawford \par - Patient with history of prostate cancer which was treated with hormones.  He is following endocrine also\par -Patient has quit Cigar smoking.\par \par - Primary care: \par - He had 2nd pneumonia vacc. 2019.  He had shingles vaccination 4 to 5 years ago.  He has Covid vaccination.\par \par \par Frederick Casanova MD, FACC, TIMA\par

## 2023-07-19 ENCOUNTER — OFFICE (OUTPATIENT)
Dept: URBAN - METROPOLITAN AREA CLINIC 97 | Facility: CLINIC | Age: 78
Setting detail: OPHTHALMOLOGY
End: 2023-07-19
Payer: MEDICARE

## 2023-07-19 DIAGNOSIS — Z96.1: ICD-10-CM

## 2023-07-19 DIAGNOSIS — H16.223: ICD-10-CM

## 2023-07-19 DIAGNOSIS — H35.371: ICD-10-CM

## 2023-07-19 DIAGNOSIS — H26.492: ICD-10-CM

## 2023-07-19 PROCEDURE — 92014 COMPRE OPH EXAM EST PT 1/>: CPT | Performed by: OPHTHALMOLOGY

## 2023-07-19 ASSESSMENT — REFRACTION_AUTOREFRACTION
OD_CYLINDER: +0.75
OS_AXIS: 176
OS_SPHERE: PLANO
OS_CYLINDER: +0.75
OD_SPHERE: -0.25
OD_AXIS: 010

## 2023-07-19 ASSESSMENT — REFRACTION_MANIFEST
OS_ADD: +2.25
OD_ADD: +2.50
OU_VA: 20/20
OS_VA2: 20/20(J1+)
OS_ADD: +2.50
OS_CYLINDER: SPH
OD_CYLINDER: +0.25
OS_VA1: 20/20
OU_VA: 20/20
OD_AXIS: 010
OD_VA1: 20/20
OD_CYLINDER: SPH
OD_ADD: +2.25
OS_CYLINDER: +0.50
OS_AXIS: 160
OS_SPHERE: PLANO
OD_VA2: 20/20(J1+)
OS_VA2: 20/20(J1+)
OD_SPHERE: PLANO
OD_VA2: 20/20(J1+)
OS_SPHERE: PLANO
OD_SPHERE: PLANO
OD_VA1: 20/20
OS_VA1: 20/20

## 2023-07-19 ASSESSMENT — REFRACTION_CURRENTRX
OS_CYLINDER: SPH
OS_OVR_VA: 20/
OS_AXIS: 038
OD_OVR_VA: 20/
OS_SPHERE: -0.75
OS_SPHERE: +2.00
OS_VPRISM_DIRECTION: SV
OD_CYLINDER: SPH
OD_CYLINDER: +0.75
OS_VPRISM_DIRECTION: SV
OS_OVR_VA: 20/
OD_AXIS: 153
OD_VPRISM_DIRECTION: SV
OD_SPHERE: +2.00
OD_VPRISM_DIRECTION: SV
OS_CYLINDER: +0.25
OD_SPHERE: -1.00
OD_OVR_VA: 20/

## 2023-07-19 ASSESSMENT — KERATOMETRY
OD_K2POWER_DIOPTERS: 43.25
OS_K1POWER_DIOPTERS: 43.00
OS_K2POWER_DIOPTERS: 43.50
OD_AXISANGLE_DEGREES: 007
METHOD_AUTO_MANUAL: AUTO
OD_K1POWER_DIOPTERS: 42.50
OS_AXISANGLE_DEGREES: 098

## 2023-07-19 ASSESSMENT — VISUAL ACUITY
OS_BCVA: 20/20
OD_BCVA: 20/20

## 2023-07-19 ASSESSMENT — TONOMETRY
OD_IOP_MMHG: 14
OS_IOP_MMHG: 12

## 2023-07-19 ASSESSMENT — CONFRONTATIONAL VISUAL FIELD TEST (CVF)
OD_FINDINGS: FULL
OS_FINDINGS: FULL

## 2023-07-19 ASSESSMENT — SUPERFICIAL PUNCTATE KERATITIS (SPK)
OD_SPK: 1+ 2+
OS_SPK: 1+ 2+

## 2023-07-19 ASSESSMENT — AXIALLENGTH_DERIVED: OD_AL: 23.7742

## 2023-07-19 ASSESSMENT — SPHEQUIV_DERIVED: OD_SPHEQUIV: 0.125

## 2023-08-07 ENCOUNTER — RX RENEWAL (OUTPATIENT)
Age: 78
End: 2023-08-07

## 2023-08-07 RX ORDER — ATORVASTATIN CALCIUM 80 MG/1
80 TABLET, FILM COATED ORAL
Qty: 90 | Refills: 3 | Status: ACTIVE | COMMUNITY
Start: 2019-05-20 | End: 1900-01-01

## 2023-09-11 ENCOUNTER — OFFICE (OUTPATIENT)
Dept: URBAN - METROPOLITAN AREA CLINIC 97 | Facility: CLINIC | Age: 78
Setting detail: OPHTHALMOLOGY
End: 2023-09-11
Payer: MEDICARE

## 2023-09-11 ENCOUNTER — RX ONLY (RX ONLY)
Age: 78
End: 2023-09-11

## 2023-09-11 DIAGNOSIS — H26.492: ICD-10-CM

## 2023-09-11 PROCEDURE — 66821 AFTER CATARACT LASER SURGERY: CPT | Performed by: OPHTHALMOLOGY

## 2023-09-11 ASSESSMENT — REFRACTION_MANIFEST
OD_ADD: +2.25
OS_CYLINDER: +0.50
OS_AXIS: 160
OD_VA1: 20/20
OD_CYLINDER: SPH
OD_SPHERE: PLANO
OS_SPHERE: PLANO
OU_VA: 20/20
OS_ADD: +2.25
OS_VA1: 20/20
OD_SPHERE: PLANO
OD_VA2: 20/20(J1+)
OS_VA1: 20/20
OS_SPHERE: PLANO
OD_VA1: 20/20
OU_VA: 20/20
OD_CYLINDER: +0.25
OS_CYLINDER: SPH
OD_AXIS: 010
OS_VA2: 20/20(J1+)
OD_VA2: 20/20(J1+)
OD_ADD: +2.50
OS_ADD: +2.50
OS_VA2: 20/20(J1+)

## 2023-09-11 ASSESSMENT — REFRACTION_AUTOREFRACTION
OD_AXIS: 010
OS_AXIS: 176
OD_CYLINDER: +0.75
OD_SPHERE: -0.25
OS_SPHERE: PLANO
OS_CYLINDER: +0.75

## 2023-09-11 ASSESSMENT — KERATOMETRY
OD_K1POWER_DIOPTERS: 42.50
OS_AXISANGLE_DEGREES: 098
OD_K2POWER_DIOPTERS: 43.25
OS_K1POWER_DIOPTERS: 43.00
OS_K2POWER_DIOPTERS: 43.50
OD_AXISANGLE_DEGREES: 007
METHOD_AUTO_MANUAL: AUTO

## 2023-09-11 ASSESSMENT — REFRACTION_CURRENTRX
OS_AXIS: 038
OS_CYLINDER: SPH
OD_OVR_VA: 20/
OS_VPRISM_DIRECTION: SV
OD_SPHERE: +2.00
OD_OVR_VA: 20/
OS_SPHERE: -0.75
OD_CYLINDER: +0.75
OS_CYLINDER: +0.25
OD_CYLINDER: SPH
OS_OVR_VA: 20/
OS_OVR_VA: 20/
OD_VPRISM_DIRECTION: SV
OD_VPRISM_DIRECTION: SV
OS_VPRISM_DIRECTION: SV
OD_SPHERE: -1.00
OS_SPHERE: +2.00
OD_AXIS: 153

## 2023-09-11 ASSESSMENT — CONFRONTATIONAL VISUAL FIELD TEST (CVF)
OD_FINDINGS: FULL
OS_FINDINGS: FULL

## 2023-09-11 ASSESSMENT — VISUAL ACUITY
OS_BCVA: 20/20
OD_BCVA: 20/30+2

## 2023-09-11 ASSESSMENT — TONOMETRY
OS_IOP_MMHG: 14
OD_IOP_MMHG: 15

## 2023-09-11 ASSESSMENT — AXIALLENGTH_DERIVED: OD_AL: 23.7742

## 2023-09-11 ASSESSMENT — SPHEQUIV_DERIVED: OD_SPHEQUIV: 0.125

## 2023-09-11 ASSESSMENT — SUPERFICIAL PUNCTATE KERATITIS (SPK)
OD_SPK: 1+ 2+
OS_SPK: 1+ 2+

## 2023-12-12 ENCOUNTER — APPOINTMENT (OUTPATIENT)
Dept: CARDIOLOGY | Facility: CLINIC | Age: 78
End: 2023-12-12
Payer: MEDICARE

## 2023-12-12 VITALS
BODY MASS INDEX: 28.04 KG/M2 | OXYGEN SATURATION: 99 % | RESPIRATION RATE: 12 BRPM | WEIGHT: 207 LBS | HEIGHT: 72 IN | HEART RATE: 50 BPM | DIASTOLIC BLOOD PRESSURE: 68 MMHG | SYSTOLIC BLOOD PRESSURE: 132 MMHG

## 2023-12-12 DIAGNOSIS — S62.639A DISPLACED FRACTURE OF DISTAL PHALANX OF UNSPECIFIED FINGER, INITIAL ENCOUNTER FOR CLOSED FRACTURE: ICD-10-CM

## 2023-12-12 PROCEDURE — 99214 OFFICE O/P EST MOD 30 MIN: CPT

## 2024-01-17 ENCOUNTER — OFFICE (OUTPATIENT)
Dept: URBAN - METROPOLITAN AREA CLINIC 97 | Facility: CLINIC | Age: 79
Setting detail: OPHTHALMOLOGY
End: 2024-01-17
Payer: MEDICARE

## 2024-01-17 DIAGNOSIS — H16.223: ICD-10-CM

## 2024-01-17 DIAGNOSIS — H35.373: ICD-10-CM

## 2024-01-17 DIAGNOSIS — Z96.1: ICD-10-CM

## 2024-01-17 PROCEDURE — 99213 OFFICE O/P EST LOW 20 MIN: CPT | Performed by: OPHTHALMOLOGY

## 2024-01-17 ASSESSMENT — REFRACTION_AUTOREFRACTION
OS_AXIS: 167
OS_CYLINDER: +1.50
OS_SPHERE: -0.50
OD_CYLINDER: +0.50
OD_AXIS: 019
OD_SPHERE: -0.50

## 2024-01-17 ASSESSMENT — REFRACTION_CURRENTRX
OS_VPRISM_DIRECTION: SV
OS_SPHERE: -0.75
OS_VPRISM_DIRECTION: SV
OD_AXIS: 153
OS_AXIS: 038
OS_OVR_VA: 20/
OD_OVR_VA: 20/
OD_SPHERE: -1.00
OS_SPHERE: +2.00
OS_CYLINDER: SPH
OS_CYLINDER: +0.25
OD_OVR_VA: 20/
OD_VPRISM_DIRECTION: SV
OD_SPHERE: +2.00
OD_CYLINDER: SPH
OD_CYLINDER: +0.75
OS_OVR_VA: 20/
OD_VPRISM_DIRECTION: SV

## 2024-01-17 ASSESSMENT — REFRACTION_MANIFEST
OD_VA1: 20/20
OD_AXIS: 010
OS_VA2: 20/20(J1+)
OS_CYLINDER: SPH
OS_AXIS: 160
OD_VA2: 20/20(J1+)
OU_VA: 20/20
OU_VA: 20/20
OS_SPHERE: PLANO
OS_ADD: +2.25
OD_CYLINDER: +0.25
OS_VA1: 20/20
OS_ADD: +2.50
OD_CYLINDER: SPH
OD_ADD: +2.50
OS_VA2: 20/20(J1+)
OD_SPHERE: PLANO
OS_VA1: 20/20
OS_CYLINDER: +0.50
OD_ADD: +2.25
OD_VA1: 20/20
OS_SPHERE: PLANO
OD_VA2: 20/20(J1+)
OD_SPHERE: PLANO

## 2024-01-17 ASSESSMENT — SPHEQUIV_DERIVED
OD_SPHEQUIV: -0.25
OS_SPHEQUIV: 0.25

## 2024-01-17 ASSESSMENT — SUPERFICIAL PUNCTATE KERATITIS (SPK)
OS_SPK: 1+ 2+
OD_SPK: 1+ 2+

## 2024-01-18 ENCOUNTER — NON-APPOINTMENT (OUTPATIENT)
Age: 79
End: 2024-01-18

## 2024-01-18 ENCOUNTER — APPOINTMENT (OUTPATIENT)
Dept: CARDIOLOGY | Facility: CLINIC | Age: 79
End: 2024-01-18
Payer: MEDICARE

## 2024-01-18 VITALS
HEART RATE: 50 BPM | RESPIRATION RATE: 12 BRPM | BODY MASS INDEX: 28.71 KG/M2 | SYSTOLIC BLOOD PRESSURE: 108 MMHG | HEIGHT: 72 IN | OXYGEN SATURATION: 98 % | WEIGHT: 212 LBS | DIASTOLIC BLOOD PRESSURE: 60 MMHG

## 2024-01-18 PROCEDURE — 93000 ELECTROCARDIOGRAM COMPLETE: CPT

## 2024-01-18 RX ORDER — ANTIARTHRITIC COMBINATION NO.2 900 MG
TABLET ORAL
Refills: 0 | Status: ACTIVE | COMMUNITY

## 2024-01-18 RX ORDER — ASPIRIN 81 MG
600-200 TABLET, DELAYED RELEASE (ENTERIC COATED) ORAL
Refills: 0 | Status: ACTIVE | COMMUNITY

## 2024-01-18 RX ORDER — NEBIVOLOL 5 MG/1
5 TABLET ORAL
Qty: 90 | Refills: 2 | Status: ACTIVE | COMMUNITY
Start: 2022-08-26 | End: 1900-01-01

## 2024-01-18 NOTE — DISCUSSION/SUMMARY
[FreeTextEntry1] : - Solitary varicose vein right forearm; if it increases in size, refer to vascular; so far it has been asymptomatic and stable - Overweight: Patient trying to reduce weight.  He had lost 26 pounds in the past  year but he has regained 5 pounds since December.  Also reduce high glycemic foods and saturated fats.  BS was 115 in November 2022 and 129 in July 2023 - prolactin levels decreased.  Follows endocrine.  Breast lump regressed as per the patient - He follows with pulmonary (sees Dr. 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 - Continue follow-up with Dr. Crawford for findings in the liver with MRI. Elevated bilirubin.   -Right eye blurriness improved significantly with laser treatment ophthalmology -He has a small rough skin area over the left temporal region.  He consulted dermatology.  It was removed -Patient with history of prostate cancer which was treated with hormones.  He is following endocrine also -Patient has quit Cigar smoking.  - Primary care: Labs July 2023 - He had 2nd pneumonia vacc. 2019.  He had shingles vaccination 4 to 5 years ago.  He has Covid vaccination.  ** PREOP: Patient without any major cardiovascular events in the past few years.  No active cardiac symptoms.  EKG in the office today shows sinus rhythm, PACs and bigeminy, right bundle branch block.  No change in ST and T.  The patient is able to go up 2 flight of steps without any difficulty.  He is scheduled to undergo clearing of nasal passages under conscious sedation at [facility].  He should continue atorvastatin and nebivolol without interruption.  Patient should be low risk for major cardiovascular events and should proceed as planned  Frederick Casanova MD, FACC, [facility]

## 2024-01-18 NOTE — HISTORY OF PRESENT ILLNESS
[FreeTextEntry1] : Dedrick is a pleasant 78-year-old male With history of overweight., hypertension, dyslipidemia, mild mitral valve prolapse, Prediabetes, EDS   No further falls since May 2022  Prior HX:  ASx for high T. Sujit on labs In the past. He has mild Polycythemia with mildly low plt  & T. Sujit is borderline high. He is ASx. He follows Dr. Solorio.   Recently, breast lump was noted.  He consulted Dr. Solorio.  Mammogram and ultrasound showed benign lump.  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  Patient is asymptomatic for it.  No chest pain or cough.  Endocrine was consulted.  He had a transient elevation of prolactin hormone which has now normalized?  HTN is chronic, controlled with meds and ASx.   He as Mild pre-diabetes.  He has gained some weight since the last office visit.  No history of MI, CHF, CVA, cardiac syncope, angina, chronic renal disease. No shortness of breath  He had high PSA - chronic, seeing specialist at [facility] -on radiation treatment and hormone therapy for prostate cancer.  No Urinary Sx. PSA has come down significantly.   Denies any palpitations, shortness of breath, PND, orthopnea.     gait training/transfer training/bed mobility training

## 2024-06-10 ENCOUNTER — APPOINTMENT (OUTPATIENT)
Dept: CARDIOLOGY | Facility: CLINIC | Age: 79
End: 2024-06-10
Payer: MEDICARE

## 2024-06-10 VITALS
HEART RATE: 56 BPM | WEIGHT: 219 LBS | DIASTOLIC BLOOD PRESSURE: 66 MMHG | SYSTOLIC BLOOD PRESSURE: 120 MMHG | HEIGHT: 72 IN | BODY MASS INDEX: 29.66 KG/M2 | OXYGEN SATURATION: 98 % | RESPIRATION RATE: 12 BRPM

## 2024-06-10 DIAGNOSIS — M77.8 OTHER ENTHESOPATHIES, NOT ELSEWHERE CLASSIFIED: ICD-10-CM

## 2024-06-10 PROCEDURE — 99214 OFFICE O/P EST MOD 30 MIN: CPT

## 2024-06-10 PROCEDURE — G2211 COMPLEX E/M VISIT ADD ON: CPT

## 2024-06-10 NOTE — HISTORY OF PRESENT ILLNESS
[FreeTextEntry1] : Dedrick is a pleasant 78-year-old male With history of overweight., hypertension, dyslipidemia, mild mitral valve prolapse, Prediabetes, EDS   No further falls since May 2022  Prior HX:  ASx for high T. Sujit on labs In the past. He has mild Polycythemia with mildly low plt  & T. Sujit is borderline high. He is ASx. He follows Dr. Solorio.   Recently, breast lump was noted.  He consulted Dr. Solorio.  Mammogram and ultrasound showed benign lump.  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  Patient is asymptomatic for it.  No chest pain or cough.  Endocrine was consulted.  He had a transient elevation of prolactin hormone which has now normalized?  HTN is chronic, controlled with meds and ASx.   He as Mild pre-diabetes.  He has gained some weight since the last office visit.  No history of MI, CHF, CVA, cardiac syncope, angina, chronic renal disease. No shortness of breath  He had high PSA - chronic, seeing specialist at [facility] -on radiation treatment and hormone therapy for prostate cancer.  No Urinary Sx. PSA has come down significantly.   Denies any palpitations, shortness of breath, PND, orthopnea.

## 2024-06-10 NOTE — DISCUSSION/SUMMARY
[FreeTextEntry1] : - Solitary varicose vein right forearm; if it increases in size, refer to vascular; so far it has been asymptomatic and stable - Overweight /mild fasting hyperglycemia: Patient trying to reduce weight.  He had lost 26 pounds in the past  year but he has regained  > 15 pounds since December.  He was recommended reduction of high glycemic foods and saturated fats.  BS was 115 in November 2022 and 129 in July 2023 and 113 in June 2024.  Followed by endocrine. - prolactin levels decreased.  Follows endocrine.  Breast lump regressed as per the patient - He follows with pulmonary (sees Dr. 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 - Continue follow-up with Dr. Crawford for findings in the liver with MRI. Elevated stable and asymptomatic total bilirubin.   -Right eye blurriness improved significantly with laser treatment ophthalmology -He has a small rough skin area over the left temporal region.  He consulted dermatology.  It was removed -Patient with history of prostate cancer which was treated with hormones.  He is following endocrine also -Patient has quit Cigar smoking.  **Primary care:  -Labs in 2024 discussed in detail: Normal CBC.   BMP.  Will BMP.  Borderline high total bilirubin.  Calcium slightly low.  LDL 51.  Normal TG.   - He had 2nd pneumonia vacc. 2019.  He had shingles vaccination 4 to 5 years ago.  He has Covid vaccination. -Patient on vitamin D and calcium supplements due to mild hypocalcemia as per endocrine   Frederick Casanova MD, FAC, [facility]

## 2024-06-16 ENCOUNTER — NON-APPOINTMENT (OUTPATIENT)
Age: 79
End: 2024-06-16

## 2024-06-18 ENCOUNTER — APPOINTMENT (OUTPATIENT)
Dept: CARDIOLOGY | Facility: CLINIC | Age: 79
End: 2024-06-18
Payer: MEDICARE

## 2024-06-18 VITALS
SYSTOLIC BLOOD PRESSURE: 120 MMHG | HEIGHT: 78 IN | BODY MASS INDEX: 25.22 KG/M2 | WEIGHT: 218 LBS | HEART RATE: 75 BPM | DIASTOLIC BLOOD PRESSURE: 58 MMHG | OXYGEN SATURATION: 97 %

## 2024-06-18 DIAGNOSIS — C61 MALIGNANT NEOPLASM OF PROSTATE: ICD-10-CM

## 2024-06-18 DIAGNOSIS — R73.03 PREDIABETES.: ICD-10-CM

## 2024-06-18 DIAGNOSIS — E66.3 OVERWEIGHT: ICD-10-CM

## 2024-06-18 DIAGNOSIS — I49.1 ATRIAL PREMATURE DEPOLARIZATION: ICD-10-CM

## 2024-06-18 DIAGNOSIS — I10 ESSENTIAL (PRIMARY) HYPERTENSION: ICD-10-CM

## 2024-06-18 DIAGNOSIS — I34.1 NONRHEUMATIC MITRAL (VALVE) PROLAPSE: ICD-10-CM

## 2024-06-18 DIAGNOSIS — D75.1 SECONDARY POLYCYTHEMIA: ICD-10-CM

## 2024-06-18 DIAGNOSIS — R53.83 OTHER FATIGUE: ICD-10-CM

## 2024-06-18 DIAGNOSIS — E78.5 HYPERLIPIDEMIA, UNSPECIFIED: ICD-10-CM

## 2024-06-18 PROCEDURE — 99214 OFFICE O/P EST MOD 30 MIN: CPT

## 2024-06-18 PROCEDURE — G2211 COMPLEX E/M VISIT ADD ON: CPT

## 2024-06-18 PROCEDURE — 93242 EXT ECG>48HR<7D RECORDING: CPT

## 2024-06-18 RX ORDER — SULFAMETHOXAZOLE AND TRIMETHOPRIM 800; 160 MG/1; MG/1
800-160 TABLET ORAL
Refills: 0 | Status: ACTIVE | COMMUNITY

## 2024-06-18 NOTE — DISCUSSION/SUMMARY
[FreeTextEntry1] : - Asymptomatic frequent PAC.  Check ZIO recording for 3 days.  Patient already taking beta-blockers. - Solitary varicose vein right forearm; if it increases in size, refer to vascular; so far it has been asymptomatic and stable - Overweight /mild fasting hyperglycemia: Patient trying to reduce weight.  He had lost 26 pounds in the past  year but he has regained  > 15 pounds in the last 6 months.  He was recommended reduction of high glycemic foods and saturated fats.  BS was 115 in November 2022 and 129 in July 2023 and 113 in June 2024.  Followed by endocrine. - prolactin levels decreased.  Follows endocrine.  Breast lump regressed as per the patient - He follows with pulmonary (sees Dr. 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 - Continue follow-up with Dr. Crawford for findings in the liver with MRI. Elevated stable and asymptomatic total bilirubin.   -Right eye blurriness improved significantly with laser treatment ophthalmology -Patient with history of prostate cancer which was treated with hormones.  He is following endocrine also -Patient has quit Cigar smoking.  **Primary care:  -Labs in 2024 discussed in detail: Normal CBC.   BMP.  Will BMP.  Borderline high total bilirubin.  Calcium slightly low.  LDL 51.  Normal TG.   - He had 2nd pneumonia vacc. 2019.  He had shingles vaccination 4 to 5 years ago.  He has Covid vaccination. -Patient on vitamin D and calcium supplements due to mild hypocalcemia as per endocrine   Frederick Casanova MD, FACC, [facility]

## 2024-06-18 NOTE — HISTORY OF PRESENT ILLNESS
[FreeTextEntry1] : Dedrick is a pleasant 78-year-old male With history of overweight., hypertension, dyslipidemia, mild mitral valve prolapse, Prediabetes, EDS   Prior HX:  ASx for high T. Sujit on labs In the past. He has mild Polycythemia with mildly low plt  He follows Dr. Solorio.   A breast lump was noted.  He consulted Dr. Solorio.  Mammogram and ultrasound showed benign lump.  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  Patient is asymptomatic for it.  No chest pain or cough. Endocrine was consulted.  He had a transient elevation of prolactin hormone which has now normalized?  HTN is chronic, controlled with meds and ASx.   He as Mild pre-diabetes.  He has gained some weight since the last office visit.  No history of MI, CHF, CVA, cardiac syncope, angina, chronic renal disease. No shortness of breath  He had high PSA - chronic, seeing specialist at [facility] -on radiation treatment and hormone therapy for prostate cancer.  No Urinary Sx. PSA has come down significantly.   Denies any palpitations, shortness of breath, PND, orthopnea.    Patient had infection of the left elbow and required drainage.  He was treated by urgent care.  He is now on Bactrim.  Orthopedic follow-up is pending.  Afebrile.  No chills.  His EKG showed frequent asymptomatic PACs which is not new for him. (Noted on previous EKGs also)

## 2024-06-28 PROCEDURE — 93244 EXT ECG>48HR<7D REV&INTERPJ: CPT

## 2024-07-16 ENCOUNTER — NON-APPOINTMENT (OUTPATIENT)
Age: 79
End: 2024-07-16

## 2024-07-16 ENCOUNTER — APPOINTMENT (OUTPATIENT)
Dept: CARDIOLOGY | Facility: CLINIC | Age: 79
End: 2024-07-16
Payer: MEDICARE

## 2024-07-16 VITALS
HEART RATE: 35 BPM | DIASTOLIC BLOOD PRESSURE: 64 MMHG | HEIGHT: 72 IN | WEIGHT: 222 LBS | SYSTOLIC BLOOD PRESSURE: 132 MMHG | BODY MASS INDEX: 30.07 KG/M2 | OXYGEN SATURATION: 98 %

## 2024-07-16 DIAGNOSIS — R53.83 OTHER FATIGUE: ICD-10-CM
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
## 2024-07-16 DIAGNOSIS — I10 ESSENTIAL (PRIMARY) HYPERTENSION: ICD-10-CM

## 2024-07-16 DIAGNOSIS — Z87.898 PERSONAL HISTORY OF OTHER SPECIFIED CONDITIONS: ICD-10-CM

## 2024-07-16 DIAGNOSIS — E78.5 HYPERLIPIDEMIA, UNSPECIFIED: ICD-10-CM

## 2024-07-16 DIAGNOSIS — E66.3 OVERWEIGHT: ICD-10-CM

## 2024-07-16 DIAGNOSIS — R00.1 BRADYCARDIA, UNSPECIFIED: ICD-10-CM

## 2024-07-16 DIAGNOSIS — Z87.891 PERSONAL HISTORY OF NICOTINE DEPENDENCE: ICD-10-CM

## 2024-07-16 DIAGNOSIS — Z86.19 PERSONAL HISTORY OF OTHER INFECTIOUS AND PARASITIC DISEASES: ICD-10-CM

## 2024-07-16 DIAGNOSIS — I34.1 NONRHEUMATIC MITRAL (VALVE) PROLAPSE: ICD-10-CM

## 2024-07-16 DIAGNOSIS — I49.1 ATRIAL PREMATURE DEPOLARIZATION: ICD-10-CM

## 2024-07-16 DIAGNOSIS — S62.639A DISPLACED FRACTURE OF DISTAL PHALANX OF UNSPECIFIED FINGER, INITIAL ENCOUNTER FOR CLOSED FRACTURE: ICD-10-CM

## 2024-07-16 DIAGNOSIS — F17.200 NICOTINE DEPENDENCE, UNSPECIFIED, UNCOMPLICATED: ICD-10-CM

## 2024-07-16 DIAGNOSIS — M77.8 OTHER ENTHESOPATHIES, NOT ELSEWHERE CLASSIFIED: ICD-10-CM

## 2024-07-16 DIAGNOSIS — Z86.010 PERSONAL HISTORY OF COLONIC POLYPS: ICD-10-CM

## 2024-07-16 PROCEDURE — G2211 COMPLEX E/M VISIT ADD ON: CPT

## 2024-07-16 PROCEDURE — 93000 ELECTROCARDIOGRAM COMPLETE: CPT

## 2024-07-16 PROCEDURE — 99214 OFFICE O/P EST MOD 30 MIN: CPT
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
## 2024-07-18 PROBLEM — Z86.010 HISTORY OF COLONIC POLYPS: Status: RESOLVED | Noted: 2018-02-01 | Resolved: 2024-07-18

## 2024-07-18 PROBLEM — Z87.898 H/O SHORTNESS OF BREATH: Status: RESOLVED | Noted: 2018-01-22 | Resolved: 2024-07-18

## 2024-07-18 PROBLEM — Z86.19 HISTORY OF LYME DISEASE: Status: RESOLVED | Noted: 2018-01-22 | Resolved: 2024-07-18

## 2024-07-31 ENCOUNTER — OFFICE (OUTPATIENT)
Dept: URBAN - METROPOLITAN AREA CLINIC 97 | Facility: CLINIC | Age: 79
Setting detail: OPHTHALMOLOGY
End: 2024-07-31
Payer: MEDICARE

## 2024-07-31 DIAGNOSIS — H16.223: ICD-10-CM

## 2024-07-31 DIAGNOSIS — Z96.1: ICD-10-CM

## 2024-07-31 DIAGNOSIS — H35.373: ICD-10-CM

## 2024-07-31 DIAGNOSIS — H40.013: ICD-10-CM

## 2024-07-31 PROBLEM — H31.002 CHORIORETINAL SCARS; LEFT EYE: Status: ACTIVE | Noted: 2024-07-31

## 2024-07-31 PROCEDURE — 92014 COMPRE OPH EXAM EST PT 1/>: CPT | Performed by: OPHTHALMOLOGY

## 2024-07-31 PROCEDURE — 92250 FUNDUS PHOTOGRAPHY W/I&R: CPT | Performed by: OPHTHALMOLOGY

## 2024-07-31 ASSESSMENT — CONFRONTATIONAL VISUAL FIELD TEST (CVF)
OD_FINDINGS: FULL
OS_FINDINGS: FULL

## 2024-08-26 ENCOUNTER — APPOINTMENT (OUTPATIENT)
Dept: CARDIOLOGY | Facility: CLINIC | Age: 79
End: 2024-08-26
Payer: MEDICARE

## 2024-08-26 PROCEDURE — 93015 CV STRESS TEST SUPVJ I&R: CPT

## 2024-09-03 ENCOUNTER — APPOINTMENT (OUTPATIENT)
Dept: CARDIOLOGY | Facility: CLINIC | Age: 79
End: 2024-09-03
Payer: MEDICARE

## 2024-09-03 VITALS
HEIGHT: 72 IN | WEIGHT: 218 LBS | DIASTOLIC BLOOD PRESSURE: 64 MMHG | BODY MASS INDEX: 29.53 KG/M2 | OXYGEN SATURATION: 96 % | SYSTOLIC BLOOD PRESSURE: 120 MMHG | HEART RATE: 39 BPM
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
## 2024-09-03 DIAGNOSIS — C61 MALIGNANT NEOPLASM OF PROSTATE: ICD-10-CM

## 2024-09-03 DIAGNOSIS — R73.03 PREDIABETES.: ICD-10-CM

## 2024-09-03 DIAGNOSIS — Z87.891 PERSONAL HISTORY OF NICOTINE DEPENDENCE: ICD-10-CM

## 2024-09-03 DIAGNOSIS — M77.8 OTHER ENTHESOPATHIES, NOT ELSEWHERE CLASSIFIED: ICD-10-CM

## 2024-09-03 DIAGNOSIS — F17.200 NICOTINE DEPENDENCE, UNSPECIFIED, UNCOMPLICATED: ICD-10-CM

## 2024-09-03 DIAGNOSIS — I10 ESSENTIAL (PRIMARY) HYPERTENSION: ICD-10-CM

## 2024-09-03 DIAGNOSIS — R00.1 BRADYCARDIA, UNSPECIFIED: ICD-10-CM

## 2024-09-03 DIAGNOSIS — D75.1 SECONDARY POLYCYTHEMIA: ICD-10-CM

## 2024-09-03 DIAGNOSIS — S62.639A DISPLACED FRACTURE OF DISTAL PHALANX OF UNSPECIFIED FINGER, INITIAL ENCOUNTER FOR CLOSED FRACTURE: ICD-10-CM

## 2024-09-03 DIAGNOSIS — Z86.010 PERSONAL HISTORY OF COLONIC POLYPS: ICD-10-CM

## 2024-09-03 DIAGNOSIS — Z86.19 PERSONAL HISTORY OF OTHER INFECTIOUS AND PARASITIC DISEASES: ICD-10-CM

## 2024-09-03 DIAGNOSIS — R53.83 OTHER FATIGUE: ICD-10-CM

## 2024-09-03 DIAGNOSIS — I34.1 NONRHEUMATIC MITRAL (VALVE) PROLAPSE: ICD-10-CM

## 2024-09-03 DIAGNOSIS — Z87.898 PERSONAL HISTORY OF OTHER SPECIFIED CONDITIONS: ICD-10-CM

## 2024-09-03 DIAGNOSIS — I49.1 ATRIAL PREMATURE DEPOLARIZATION: ICD-10-CM

## 2024-09-03 DIAGNOSIS — E78.5 HYPERLIPIDEMIA, UNSPECIFIED: ICD-10-CM

## 2024-09-03 DIAGNOSIS — E66.3 OVERWEIGHT: ICD-10-CM

## 2024-09-03 PROCEDURE — 99214 OFFICE O/P EST MOD 30 MIN: CPT

## 2024-09-03 PROCEDURE — G2211 COMPLEX E/M VISIT ADD ON: CPT

## 2024-09-03 RX ORDER — UBIDECARENONE 200 MG
CAPSULE ORAL
Refills: 0 | Status: ACTIVE | COMMUNITY

## 2024-09-03 NOTE — HISTORY OF PRESENT ILLNESS
[FreeTextEntry1] : 78-year-old male who is overweight, with hypertension, dyslipidemia, mild mitral valve prolapse, Prediabetes, EDS   Prior HX:  High T. Sujit on labs In the past. He has mild Polycythemia with mildly low plt  He follows Dr. Solorio.   A breast lump was noted.  He consulted Dr. Solorio.  Mammogram and ultrasound showed benign lump.  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  Patient is asymptomatic for it.  No chest pain or cough. Endocrine was consulted.  He had a transient elevation of prolactin hormone which has now normalized?  HTN is chronic, controlled with meds and ASx.   He as Mild pre-diabetes.  He has gained some weight since the last office visit.  No history of MI, CHF, CVA, cardiac syncope, angina, chronic renal disease. No shortness of breath  He had high PSA - chronic, seeing specialist at [facility] -on radiation treatment and hormone therapy for prostate cancer.  No Urinary Sx. PSA has come down significantly.   Denies any palpitations, shortness of breath, PND, orthopnea.    Patient had infection of the left elbow and required drainage.  He was treated by urgent care.  He is now on Bactrim.  Orthopedic follow-up is pending.  Afebrile.  No chills.  His EKG showed frequent asymptomatic PACs which is not new for him. (Noted on previous EKGs also)  Above noted and the patient was referred to the EP service for the assessment of asymptomatic bradycardia.

## 2024-09-03 NOTE — DISCUSSION/SUMMARY
[FreeTextEntry1] : - Asymptomatic frequent PAC.  Check ZIO recording for 3 days.  Patient already taking beta-blockers. - Solitary varicose vein right forearm; if it increases in size, refer to vascular; so far it has been asymptomatic and stable - Overweight /mild fasting hyperglycemia: Patient trying to reduce weight.  He had lost 26 pounds in the past  year but he has regained  > 15 pounds in the last 6 months.  He was recommended reduction of high glycemic foods and saturated fats.  BS was 115 in 2022 and 129 in 2023 and 113 in 2024.  Followed by endocrine. - prolactin levels decreased.  Follows endocrine.  Breast lump regressed as per the patient - He follows with pulmonary (sees Dr. 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 - Continue follow-up with Dr. Crawford for findings in the liver with MRI. Elevated stable and asymptomatic total bilirubin.   -Right eye blurriness improved significantly with laser treatment ophthalmology -Patient with history of prostate cancer which was treated with hormones.  He is following endocrine also -Patient has quit Cigar smoking.  **Primary care:  -Labs in  discussed in detail: Normal CBC.   BMP.  Will BMP.  Borderline high total bilirubin.  Calcium slightly low.  LDL 51.  Normal TG.   - He had 2nd pneumonia vacc. 2019.  He had shingles vaccination 4 to 5 years ago.  He has Covid vaccination. -Patient on vitamin D and calcium supplements due to mild hypocalcemia as per endocrine  Asymptomatic Bradycardia From an EP standpoint, patient underwent Exercise stress test which demonstrated chronotropic competence. Patient's heart rate went up to 107 bpm.  Patient also underwent a sleep study, will f/u report.  Recommendations were discussed with patient's primary cardiologist, Dr. Casanova.  No indication for PPM at this time.  ECG reviewed which is HR of 62 beats per minute with conducted PACs.  F/U in 6 months   I have spent 35 minutes with the patient includin minutes preparing for the visit  20 minutes face to face. 10 minutes on documentation and coordination of care.

## 2024-09-26 ENCOUNTER — APPOINTMENT (OUTPATIENT)
Dept: PULMONOLOGY | Facility: CLINIC | Age: 79
End: 2024-09-26
Payer: MEDICARE

## 2024-09-26 DIAGNOSIS — E66.3 OVERWEIGHT: ICD-10-CM

## 2024-09-26 DIAGNOSIS — I10 ESSENTIAL (PRIMARY) HYPERTENSION: ICD-10-CM

## 2024-09-26 DIAGNOSIS — R06.83 SNORING: ICD-10-CM

## 2024-09-26 PROCEDURE — 99443: CPT | Mod: 93

## 2024-09-26 NOTE — CONSULT LETTER
[Dear  ___] : Dear  [unfilled], [Consult Letter:] : I had the pleasure of evaluating your patient, [unfilled]. [Consult Closing:] : Thank you very much for allowing me to participate in the care of this patient.  If you have any questions, please do not hesitate to contact me. [DrScott  ___] : Dr. SU

## 2024-09-27 NOTE — HISTORY OF PRESENT ILLNESS
[Date: ___] : Date of most recent diagnostic polysomnogram: [unfilled] [AHI: ___ per hour] : Apnea-hypopnea index:  [unfilled] per hour [T90%: ___] : T90%: [unfilled]% [Home] : at home, [unfilled] , at the time of the visit. [Medical Office: ([facility])___] : at the medical office located in  [Verbal consent obtained from patient] : the patient, [unfilled] [FreeTextEntry1] : Scheduled telehealth visit for r/o LUZ MARINA. He could not get his camera to work; had to be done as a telephonic.  Had HST from his cardiologist Dr Ortega due to arrythmia. Hx HTN, sinus bradycardia.  Denies known snoring or apneas per his wife. No EDS with ESS only 4.   HST done 7/2024 was neg for LUZ MARINA, mild snoring seen.   Sees Dr Arriaga for pulm for an anterior mediastinal mass.  hgb 16.7 on 6/3/24.  Quit smoking cigarettes at age 36.   BMI hovers 29-30.  [Daytime Somnolence] : no daytime somnolence [ESS] : 4

## 2024-09-27 NOTE — REVIEW OF SYSTEMS
[FreeTextEntry3] : per HPI [FreeTextEntry8] : per HPI [FreeTextEntry9] : per HPI [de-identified] : per HPI

## 2024-09-27 NOTE — REASON FOR VISIT
[Initial Eval - Existing Diagnosis] : an initial evaluation of an existing diagnosis [FreeTextEntry2] : snoring

## 2024-09-27 NOTE — REVIEW OF SYSTEMS
[FreeTextEntry3] : per HPI [FreeTextEntry8] : per HPI [FreeTextEntry9] : per HPI [de-identified] : per HPI

## 2024-09-27 NOTE — HISTORY OF PRESENT ILLNESS
[Date: ___] : Date of most recent diagnostic polysomnogram: [unfilled] [AHI: ___ per hour] : Apnea-hypopnea index:  [unfilled] per hour [T90%: ___] : T90%: [unfilled]% [Home] : at home, [unfilled] , at the time of the visit. [Medical Office: ([facility])___] : at the medical office located in  [Verbal consent obtained from patient] : the patient, [unfilled] [FreeTextEntry1] : Scheduled telehealth visit for r/o LUZ MARINA. He could not get his camera to work; had to be done as a telephonic.  Had HST from his cardiologist Dr Ortega due to arrythmia. Hx HTN, sinus bradycardia.  Denies known snoring or apneas per his wife. No EDS with ESS only 4.   HST done 7/2024 was neg for LUZ MARINA, mild snoring seen.   Sees Dr Arriaga for pulm for an anterior mediastinal mass.  hgb 16.7 on 6/3/24.  Quit smoking cigarettes at age 36.   BMI hovers 29-30.  [Daytime Somnolence] : no daytime somnolence [ESS] : 4

## 2024-10-14 ENCOUNTER — RX RENEWAL (OUTPATIENT)
Age: 79
End: 2024-10-14

## 2024-12-13 ENCOUNTER — APPOINTMENT (OUTPATIENT)
Dept: CARDIOLOGY | Facility: CLINIC | Age: 79
End: 2024-12-13
Payer: MEDICARE

## 2024-12-13 ENCOUNTER — NON-APPOINTMENT (OUTPATIENT)
Age: 79
End: 2024-12-13

## 2024-12-13 VITALS
OXYGEN SATURATION: 97 % | BODY MASS INDEX: 29.53 KG/M2 | DIASTOLIC BLOOD PRESSURE: 60 MMHG | WEIGHT: 218 LBS | SYSTOLIC BLOOD PRESSURE: 130 MMHG | HEIGHT: 72 IN | HEART RATE: 67 BPM

## 2024-12-13 DIAGNOSIS — I10 ESSENTIAL (PRIMARY) HYPERTENSION: ICD-10-CM

## 2024-12-13 DIAGNOSIS — E66.3 OVERWEIGHT: ICD-10-CM

## 2024-12-13 DIAGNOSIS — R06.83 SNORING: ICD-10-CM

## 2024-12-13 DIAGNOSIS — E78.5 HYPERLIPIDEMIA, UNSPECIFIED: ICD-10-CM

## 2024-12-13 DIAGNOSIS — C61 MALIGNANT NEOPLASM OF PROSTATE: ICD-10-CM

## 2024-12-13 DIAGNOSIS — I34.1 NONRHEUMATIC MITRAL (VALVE) PROLAPSE: ICD-10-CM

## 2024-12-13 DIAGNOSIS — I49.1 ATRIAL PREMATURE DEPOLARIZATION: ICD-10-CM

## 2024-12-13 DIAGNOSIS — R00.1 BRADYCARDIA, UNSPECIFIED: ICD-10-CM

## 2024-12-13 DIAGNOSIS — D75.1 SECONDARY POLYCYTHEMIA: ICD-10-CM

## 2024-12-13 DIAGNOSIS — R73.03 PREDIABETES.: ICD-10-CM

## 2024-12-13 PROCEDURE — G2211 COMPLEX E/M VISIT ADD ON: CPT

## 2024-12-13 PROCEDURE — 99214 OFFICE O/P EST MOD 30 MIN: CPT

## 2024-12-13 RX ORDER — ABIRATERONE ACETATE 250 MG/1
250 TABLET, FILM COATED ORAL
Refills: 0 | Status: ACTIVE | COMMUNITY

## 2024-12-13 RX ORDER — PREDNISONE 5 MG/1
5 TABLET ORAL DAILY
Refills: 0 | Status: ACTIVE | COMMUNITY

## 2024-12-13 RX ORDER — ERGOCALCIFEROL 1.25 MG/1
50000 CAPSULE ORAL
Refills: 0 | Status: ACTIVE | COMMUNITY

## 2024-12-13 RX ORDER — LEUPROLIDE ACETATE 11.25 MG
KIT INTRAMUSCULAR
Refills: 0 | Status: ACTIVE | COMMUNITY

## 2024-12-13 RX ORDER — AMLODIPINE BESYLATE 5 MG/1
5 TABLET ORAL
Qty: 90 | Refills: 1 | Status: ACTIVE | COMMUNITY
Start: 2024-12-13 | End: 1900-01-01

## 2024-12-27 ENCOUNTER — APPOINTMENT (OUTPATIENT)
Dept: CARDIOLOGY | Facility: CLINIC | Age: 79
End: 2024-12-27
Payer: MEDICARE

## 2024-12-27 VITALS
WEIGHT: 216 LBS | HEART RATE: 66 BPM | DIASTOLIC BLOOD PRESSURE: 50 MMHG | BODY MASS INDEX: 29.3 KG/M2 | SYSTOLIC BLOOD PRESSURE: 112 MMHG | OXYGEN SATURATION: 98 %

## 2024-12-27 DIAGNOSIS — R73.03 PREDIABETES.: ICD-10-CM

## 2024-12-27 DIAGNOSIS — I10 ESSENTIAL (PRIMARY) HYPERTENSION: ICD-10-CM

## 2024-12-27 DIAGNOSIS — E66.3 OVERWEIGHT: ICD-10-CM

## 2024-12-27 DIAGNOSIS — R00.1 BRADYCARDIA, UNSPECIFIED: ICD-10-CM

## 2024-12-27 DIAGNOSIS — E78.5 HYPERLIPIDEMIA, UNSPECIFIED: ICD-10-CM

## 2024-12-27 DIAGNOSIS — I34.1 NONRHEUMATIC MITRAL (VALVE) PROLAPSE: ICD-10-CM

## 2024-12-27 DIAGNOSIS — I49.1 ATRIAL PREMATURE DEPOLARIZATION: ICD-10-CM

## 2024-12-27 DIAGNOSIS — C61 MALIGNANT NEOPLASM OF PROSTATE: ICD-10-CM

## 2024-12-27 PROCEDURE — 99213 OFFICE O/P EST LOW 20 MIN: CPT

## 2024-12-27 PROCEDURE — G2211 COMPLEX E/M VISIT ADD ON: CPT

## 2025-02-21 ENCOUNTER — APPOINTMENT (OUTPATIENT)
Dept: CARDIOLOGY | Facility: CLINIC | Age: 80
End: 2025-02-21
Payer: MEDICARE

## 2025-02-21 VITALS
OXYGEN SATURATION: 98 % | HEART RATE: 70 BPM | HEIGHT: 72 IN | SYSTOLIC BLOOD PRESSURE: 112 MMHG | DIASTOLIC BLOOD PRESSURE: 64 MMHG | BODY MASS INDEX: 29.66 KG/M2 | WEIGHT: 219 LBS | RESPIRATION RATE: 12 BRPM

## 2025-02-21 DIAGNOSIS — R60.0 LOCALIZED EDEMA: ICD-10-CM

## 2025-02-21 DIAGNOSIS — D75.1 SECONDARY POLYCYTHEMIA: ICD-10-CM

## 2025-02-21 DIAGNOSIS — Z86.79 PERSONAL HISTORY OF OTHER DISEASES OF THE CIRCULATORY SYSTEM: ICD-10-CM

## 2025-02-21 DIAGNOSIS — E66.3 OVERWEIGHT: ICD-10-CM

## 2025-02-21 DIAGNOSIS — R73.03 PREDIABETES.: ICD-10-CM

## 2025-02-21 DIAGNOSIS — I10 ESSENTIAL (PRIMARY) HYPERTENSION: ICD-10-CM

## 2025-02-21 DIAGNOSIS — E78.5 HYPERLIPIDEMIA, UNSPECIFIED: ICD-10-CM

## 2025-02-21 DIAGNOSIS — I34.1 NONRHEUMATIC MITRAL (VALVE) PROLAPSE: ICD-10-CM

## 2025-02-21 DIAGNOSIS — C61 MALIGNANT NEOPLASM OF PROSTATE: ICD-10-CM

## 2025-02-21 PROCEDURE — G2211 COMPLEX E/M VISIT ADD ON: CPT

## 2025-02-21 PROCEDURE — 99215 OFFICE O/P EST HI 40 MIN: CPT

## 2025-02-25 ENCOUNTER — APPOINTMENT (OUTPATIENT)
Dept: CARDIOLOGY | Facility: CLINIC | Age: 80
End: 2025-02-25
Payer: MEDICARE

## 2025-02-25 PROCEDURE — 93306 TTE W/DOPPLER COMPLETE: CPT

## 2025-06-18 RX ORDER — LOSARTAN POTASSIUM 25 MG/1
25 TABLET, FILM COATED ORAL
Qty: 90 | Refills: 1 | Status: ACTIVE | COMMUNITY
Start: 2025-06-18 | End: 1900-01-01

## 2025-08-04 ENCOUNTER — APPOINTMENT (OUTPATIENT)
Dept: CARDIOLOGY | Facility: CLINIC | Age: 80
End: 2025-08-04
Payer: MEDICARE

## 2025-08-04 VITALS
SYSTOLIC BLOOD PRESSURE: 140 MMHG | HEART RATE: 64 BPM | HEIGHT: 72 IN | DIASTOLIC BLOOD PRESSURE: 66 MMHG | BODY MASS INDEX: 28.71 KG/M2 | WEIGHT: 212 LBS | OXYGEN SATURATION: 98 % | RESPIRATION RATE: 12 BRPM

## 2025-08-04 DIAGNOSIS — R73.03 PREDIABETES.: ICD-10-CM

## 2025-08-04 DIAGNOSIS — C61 MALIGNANT NEOPLASM OF PROSTATE: ICD-10-CM

## 2025-08-04 DIAGNOSIS — I10 ESSENTIAL (PRIMARY) HYPERTENSION: ICD-10-CM

## 2025-08-04 DIAGNOSIS — D75.1 SECONDARY POLYCYTHEMIA: ICD-10-CM

## 2025-08-04 DIAGNOSIS — E78.5 HYPERLIPIDEMIA, UNSPECIFIED: ICD-10-CM

## 2025-08-04 DIAGNOSIS — I49.1 ATRIAL PREMATURE DEPOLARIZATION: ICD-10-CM

## 2025-08-04 DIAGNOSIS — E66.3 OVERWEIGHT: ICD-10-CM

## 2025-08-04 DIAGNOSIS — R60.0 LOCALIZED EDEMA: ICD-10-CM

## 2025-08-04 DIAGNOSIS — I34.1 NONRHEUMATIC MITRAL (VALVE) PROLAPSE: ICD-10-CM

## 2025-08-04 PROCEDURE — 99214 OFFICE O/P EST MOD 30 MIN: CPT

## 2025-08-04 PROCEDURE — 93000 ELECTROCARDIOGRAM COMPLETE: CPT

## 2025-08-04 RX ORDER — HYDROCHLOROTHIAZIDE 12.5 MG/1
12.5 TABLET ORAL
Qty: 25 | Refills: 1 | Status: ACTIVE | COMMUNITY
Start: 2025-08-04 | End: 1900-01-01

## 2025-08-04 RX ORDER — CALCITRIOL 0.25 UG/1
0.25 CAPSULE, LIQUID FILLED ORAL
Refills: 0 | Status: ACTIVE | COMMUNITY

## 2025-08-06 ENCOUNTER — OFFICE (OUTPATIENT)
Dept: URBAN - METROPOLITAN AREA CLINIC 97 | Facility: CLINIC | Age: 80
Setting detail: OPHTHALMOLOGY
End: 2025-08-06
Payer: MEDICARE

## 2025-08-06 DIAGNOSIS — Z96.1: ICD-10-CM

## 2025-08-06 DIAGNOSIS — H31.002: ICD-10-CM

## 2025-08-06 DIAGNOSIS — H16.223: ICD-10-CM

## 2025-08-06 DIAGNOSIS — H40.013: ICD-10-CM

## 2025-08-06 DIAGNOSIS — H35.373: ICD-10-CM

## 2025-08-06 PROCEDURE — 92133 CPTRZD OPH DX IMG PST SGM ON: CPT | Performed by: OPHTHALMOLOGY

## 2025-08-06 PROCEDURE — 76514 ECHO EXAM OF EYE THICKNESS: CPT | Performed by: OPHTHALMOLOGY

## 2025-08-06 PROCEDURE — 92014 COMPRE OPH EXAM EST PT 1/>: CPT | Performed by: OPHTHALMOLOGY

## 2025-08-06 ASSESSMENT — REFRACTION_MANIFEST
OS_VA2: 20/20(J1+)
OS_VA2: 20/20(J1+)
OU_VA: 20/20
OD_VA2: 20/20(J1+)
OD_CYLINDER: +0.25
OD_SPHERE: PLANO
OD_VA2: 20/20(J1+)
OS_VA1: 20/20
OD_AXIS: 010
OS_ADD: +2.75
OS_SPHERE: PLANO
OS_AXIS: 160
OD_VA1: 20/20
OD_ADD: +2.75
OD_SPHERE: PLANO
OS_CYLINDER: +0.50
OD_VA1: 20/20
OS_ADD: +2.25
OD_ADD: +2.25
OS_SPHERE: PLANO
OS_AXIS: 165
OU_VA: 20/20
OS_VA1: 20/20
OS_CYLINDER: +0.50
OD_AXIS: 010
OD_CYLINDER: +0.50

## 2025-08-06 ASSESSMENT — VISUAL ACUITY
OD_BCVA: 20/20
OS_BCVA: 20/20

## 2025-08-06 ASSESSMENT — REFRACTION_CURRENTRX
OS_VPRISM_DIRECTION: SV
OD_VPRISM_DIRECTION: SV
OD_AXIS: 153
OS_SPHERE: +2.00
OD_OVR_VA: 20/
OS_CYLINDER: +0.25
OD_CYLINDER: +0.75
OD_SPHERE: +2.00
OS_OVR_VA: 20/
OS_AXIS: 038
OD_SPHERE: -1.00
OS_OVR_VA: 20/
OD_CYLINDER: SPH
OD_OVR_VA: 20/
OS_VPRISM_DIRECTION: SV
OS_SPHERE: -0.75
OS_CYLINDER: SPH
OD_VPRISM_DIRECTION: SV

## 2025-08-06 ASSESSMENT — REFRACTION_AUTOREFRACTION
OS_SPHERE: PLANO
OD_AXIS: 012
OD_SPHERE: PLANO
OS_AXIS: 166
OD_CYLINDER: +1.00
OS_CYLINDER: +0.75

## 2025-08-06 ASSESSMENT — PACHYMETRY
OS_CT_UM: 468
OD_CT_CORRECTION: 4
OS_CT_CORRECTION: 6
OD_CT_UM: 484

## 2025-08-06 ASSESSMENT — SUPERFICIAL PUNCTATE KERATITIS (SPK)
OD_SPK: 1+ 2+
OS_SPK: 1+ 2+

## 2025-08-06 ASSESSMENT — CONFRONTATIONAL VISUAL FIELD TEST (CVF)
OS_FINDINGS: FULL
OD_FINDINGS: FULL

## 2025-08-06 ASSESSMENT — TONOMETRY: OD_IOP_MMHG: 11

## 2025-08-06 ASSESSMENT — KERATOMETRY
OD_AXISANGLE_DEGREES: 010
OD_K2POWER_DIOPTERS: 43.25
OS_K1POWER_DIOPTERS: 42.75
METHOD_AUTO_MANUAL: AUTO
OS_K2POWER_DIOPTERS: 43.00
OS_AXISANGLE_DEGREES: 162
OD_K1POWER_DIOPTERS: 42.25

## 2025-08-14 LAB — A1CG - A1C WITH ESTIMATED AVERAGE GLUCOSE: 5.7
